# Patient Record
Sex: MALE | Race: OTHER | Employment: STUDENT | URBAN - METROPOLITAN AREA
[De-identification: names, ages, dates, MRNs, and addresses within clinical notes are randomized per-mention and may not be internally consistent; named-entity substitution may affect disease eponyms.]

---

## 2019-10-11 ENCOUNTER — OFFICE VISIT (OUTPATIENT)
Dept: CARDIOLOGY | Age: 19
End: 2019-10-11

## 2019-10-11 ENCOUNTER — HOSPITAL ENCOUNTER (OUTPATIENT)
Dept: NON INVASIVE DIAGNOSTICS | Age: 19
Discharge: HOME | End: 2019-10-11
Payer: COMMERCIAL

## 2019-10-11 VITALS
OXYGEN SATURATION: 98 % | DIASTOLIC BLOOD PRESSURE: 85 MMHG | RESPIRATION RATE: 18 BRPM | BODY MASS INDEX: 25.15 KG/M2 | HEART RATE: 70 BPM | SYSTOLIC BLOOD PRESSURE: 126 MMHG | HEIGHT: 69 IN | WEIGHT: 169.8 LBS

## 2019-10-11 DIAGNOSIS — R42 DIZZINESS: ICD-10-CM

## 2019-10-11 DIAGNOSIS — R42 LIGHTHEADED: ICD-10-CM

## 2019-10-11 DIAGNOSIS — R42 LIGHTHEADED: Primary | ICD-10-CM

## 2019-10-11 DIAGNOSIS — R55 SYNCOPE, UNSPECIFIED SYNCOPE TYPE: ICD-10-CM

## 2019-10-11 LAB
LV EF: 60 %
LVEF MODALITY: NORMAL

## 2019-10-11 PROCEDURE — 99202 OFFICE O/P NEW SF 15 MIN: CPT | Performed by: INTERNAL MEDICINE

## 2019-10-11 PROCEDURE — 93017 CV STRESS TEST TRACING ONLY: CPT

## 2019-10-11 PROCEDURE — 93226 XTRNL ECG REC<48 HR SCAN A/R: CPT

## 2019-10-11 PROCEDURE — 93306 TTE W/DOPPLER COMPLETE: CPT

## 2019-10-11 PROCEDURE — 93225 XTRNL ECG REC<48 HRS REC: CPT

## 2019-10-11 PROCEDURE — 93000 ELECTROCARDIOGRAM COMPLETE: CPT | Performed by: INTERNAL MEDICINE

## 2019-10-11 RX ORDER — M-VIT,TX,IRON,MINS/CALC/FOLIC 27MG-0.4MG
1 TABLET ORAL DAILY
COMMUNITY

## 2019-10-11 SDOH — SOCIAL STABILITY: SOCIAL INSECURITY
WITHIN THE LAST YEAR, HAVE YOU BEEN HUMILIATED OR EMOTIONALLY ABUSED IN OTHER WAYS BY YOUR PARTNER OR EX-PARTNER?: PATIENT DECLINED

## 2019-10-11 SDOH — SOCIAL STABILITY: SOCIAL INSECURITY
WITHIN THE LAST YEAR, HAVE YOU BEEN KICKED, HIT, SLAPPED, OR OTHERWISE PHYSICALLY HURT BY YOUR PARTNER OR EX-PARTNER?: PATIENT DECLINED

## 2019-10-11 SDOH — SOCIAL STABILITY: SOCIAL NETWORK
DO YOU BELONG TO ANY CLUBS OR ORGANIZATIONS SUCH AS CHURCH GROUPS UNIONS, FRATERNAL OR ATHLETIC GROUPS, OR SCHOOL GROUPS?: PATIENT DECLINED

## 2019-10-11 SDOH — ECONOMIC STABILITY: FOOD INSECURITY: WITHIN THE PAST 12 MONTHS, YOU WORRIED THAT YOUR FOOD WOULD RUN OUT BEFORE YOU GOT MONEY TO BUY MORE.: PATIENT DECLINED

## 2019-10-11 SDOH — ECONOMIC STABILITY: TRANSPORTATION INSECURITY
IN THE PAST 12 MONTHS, HAS THE LACK OF TRANSPORTATION KEPT YOU FROM MEDICAL APPOINTMENTS OR FROM GETTING MEDICATIONS?: PATIENT DECLINED

## 2019-10-11 SDOH — SOCIAL STABILITY: SOCIAL INSECURITY
WITHIN THE LAST YEAR, HAVE TO BEEN RAPED OR FORCED TO HAVE ANY KIND OF SEXUAL ACTIVITY BY YOUR PARTNER OR EX-PARTNER?: PATIENT DECLINED

## 2019-10-11 SDOH — SOCIAL STABILITY: SOCIAL NETWORK: HOW OFTEN DO YOU GET TOGETHER WITH FRIENDS OR RELATIVES?: PATIENT DECLINED

## 2019-10-11 SDOH — ECONOMIC STABILITY: INCOME INSECURITY: HOW HARD IS IT FOR YOU TO PAY FOR THE VERY BASICS LIKE FOOD, HOUSING, MEDICAL CARE, AND HEATING?: PATIENT DECLINED

## 2019-10-11 SDOH — SOCIAL STABILITY: SOCIAL NETWORK: ARE YOU MARRIED, WIDOWED, DIVORCED, SEPARATED, NEVER MARRIED, OR LIVING WITH A PARTNER?: PATIENT DECLINED

## 2019-10-11 SDOH — SOCIAL STABILITY: SOCIAL NETWORK: HOW OFTEN DO YOU ATTENT MEETINGS OF THE CLUB OR ORGANIZATION YOU BELONG TO?: PATIENT DECLINED

## 2019-10-11 SDOH — HEALTH STABILITY: MENTAL HEALTH
STRESS IS WHEN SOMEONE FEELS TENSE, NERVOUS, ANXIOUS, OR CAN'T SLEEP AT NIGHT BECAUSE THEIR MIND IS TROUBLED. HOW STRESSED ARE YOU?: PATIENT DECLINED

## 2019-10-11 SDOH — SOCIAL STABILITY: SOCIAL NETWORK: IN A TYPICAL WEEK, HOW MANY TIMES DO YOU TALK ON THE PHONE WITH FAMILY, FRIENDS, OR NEIGHBORS?: PATIENT DECLINED

## 2019-10-11 SDOH — ECONOMIC STABILITY: TRANSPORTATION INSECURITY
IN THE PAST 12 MONTHS, HAS LACK OF TRANSPORTATION KEPT YOU FROM MEETINGS, WORK, OR FROM GETTING THINGS NEEDED FOR DAILY LIVING?: PATIENT DECLINED

## 2019-10-11 SDOH — ECONOMIC STABILITY: FOOD INSECURITY: WITHIN THE PAST 12 MONTHS, THE FOOD YOU BOUGHT JUST DIDN'T LAST AND YOU DIDN'T HAVE MONEY TO GET MORE.: PATIENT DECLINED

## 2019-10-11 SDOH — SOCIAL STABILITY: SOCIAL INSECURITY: WITHIN THE LAST YEAR, HAVE YOU BEEN AFRAID OF YOUR PARTNER OR EX-PARTNER?: PATIENT DECLINED

## 2019-10-11 SDOH — HEALTH STABILITY: PHYSICAL HEALTH
ON AVERAGE, HOW MANY DAYS PER WEEK DO YOU ENGAGE IN MODERATE TO STRENUOUS EXERCISE (LIKE A BRISK WALK)?: PATIENT DECLINED

## 2019-10-11 SDOH — SOCIAL STABILITY: SOCIAL NETWORK: HOW OFTEN DO YOU ATTEND CHURCH OR RELIGIOUS SERVICES?: PATIENT DECLINED

## 2019-10-11 SDOH — HEALTH STABILITY: PHYSICAL HEALTH: ON AVERAGE, HOW MANY MINUTES DO YOU ENGAGE IN EXERCISE AT THIS LEVEL?: PATIENT DECLINED

## 2019-10-14 ENCOUNTER — TELEPHONE (OUTPATIENT)
Dept: CARDIOLOGY | Age: 19
End: 2019-10-14

## 2019-10-15 ENCOUNTER — OFFICE VISIT (OUTPATIENT)
Dept: CARDIOLOGY | Age: 19
End: 2019-10-15
Payer: COMMERCIAL

## 2019-10-15 ENCOUNTER — HOSPITAL ENCOUNTER (OUTPATIENT)
Dept: NON INVASIVE DIAGNOSTICS | Age: 19
Discharge: HOME | End: 2019-10-15
Payer: COMMERCIAL

## 2019-10-15 VITALS
HEIGHT: 69 IN | SYSTOLIC BLOOD PRESSURE: 123 MMHG | BODY MASS INDEX: 25.59 KG/M2 | WEIGHT: 172.8 LBS | RESPIRATION RATE: 17 BRPM | DIASTOLIC BLOOD PRESSURE: 68 MMHG

## 2019-10-15 DIAGNOSIS — R42 LIGHTHEADED: Primary | ICD-10-CM

## 2019-10-15 DIAGNOSIS — R55 SYNCOPE, UNSPECIFIED SYNCOPE TYPE: ICD-10-CM

## 2019-10-15 DIAGNOSIS — R42 LIGHTHEADED: ICD-10-CM

## 2019-10-15 DIAGNOSIS — R42 DIZZINESS: ICD-10-CM

## 2019-10-15 LAB
ACQUISITION DURATION: NORMAL S
AVERAGE HEART RATE: 80 BPM
EKG DIAGNOSIS: NORMAL
HOLTER MAX HEART RATE: 159 BPM
HOOKUP DATE: NORMAL
HOOKUP TIME: NORMAL
MAX HEART RATE TIME/DATE: NORMAL
MIN HEART RATE TIME/DATE: NORMAL
MIN HEART RATE: 44 BPM
NUMBER OF QRS COMPLEXES: NORMAL
NUMBER OF SUPRAVENTRICULAR BEATS IN RUNS: 0
NUMBER OF SUPRAVENTRICULAR COUPLETS: 0
NUMBER OF SUPRAVENTRICULAR ECTOPICS: 0
NUMBER OF SUPRAVENTRICULAR ISOLATED BEATS: 0
NUMBER OF SUPRAVENTRICULAR RUNS: 0
NUMBER OF VENTRICULAR BEATS IN RUNS: 0
NUMBER OF VENTRICULAR BIGEMINAL CYCLES: 0
NUMBER OF VENTRICULAR COUPLETS: 0
NUMBER OF VENTRICULAR ECTOPICS: 0
NUMBER OF VENTRICULAR ISOLATED BEATS: 0
NUMBER OF VENTRICULAR RUNS: 0

## 2019-10-15 PROCEDURE — 99213 OFFICE O/P EST LOW 20 MIN: CPT | Performed by: INTERNAL MEDICINE

## 2019-10-15 PROCEDURE — 93660 TILT TABLE EVALUATION: CPT

## 2019-10-15 PROCEDURE — 6370000000 HC RX 637 (ALT 250 FOR IP): Performed by: INTERNAL MEDICINE

## 2019-10-15 RX ORDER — NITROGLYCERIN 0.3 MG/1
0.3 TABLET SUBLINGUAL EVERY 5 MIN PRN
Status: DISCONTINUED | OUTPATIENT
Start: 2019-10-15 | End: 2019-10-16 | Stop reason: HOSPADM

## 2019-10-15 RX ORDER — DILTIAZEM HYDROCHLORIDE 120 MG/1
120 CAPSULE, COATED, EXTENDED RELEASE ORAL DAILY
Qty: 90 CAPSULE | Refills: 3 | Status: SHIPPED | OUTPATIENT
Start: 2019-10-15

## 2019-10-15 RX ADMIN — NITROGLYCERIN 0.3 MG: 0.3 TABLET, ORALLY DISINTEGRATING SUBLINGUAL at 14:08

## 2019-11-12 ENCOUNTER — HOSPITAL ENCOUNTER (EMERGENCY)
Age: 19
Discharge: HOME | End: 2019-11-12
Payer: COMMERCIAL

## 2019-11-12 VITALS
RESPIRATION RATE: 14 BRPM | DIASTOLIC BLOOD PRESSURE: 70 MMHG | OXYGEN SATURATION: 100 % | TEMPERATURE: 97.9 F | WEIGHT: 167.55 LBS | HEART RATE: 80 BPM | SYSTOLIC BLOOD PRESSURE: 126 MMHG | BODY MASS INDEX: 24.74 KG/M2

## 2019-11-12 DIAGNOSIS — R42 LIGHT HEADEDNESS: Primary | ICD-10-CM

## 2019-11-12 LAB
ANION GAP SERPL CALCULATED.3IONS-SCNC: 13 MMOL/L (ref 9–17)
BUN BLDV-MCNC: 13 MG/DL (ref 6–20)
BUN/CREAT BLD: 13 (ref 9–20)
CALCIUM SERPL-MCNC: 9.8 MG/DL (ref 8.6–10.4)
CHLORIDE BLD-SCNC: 98 MMOL/L (ref 98–107)
CO2: 27 MMOL/L (ref 20–31)
CREAT SERPL-MCNC: 0.98 MG/DL (ref 0.7–1.2)
GFR AFRICAN AMERICAN: >60 ML/MIN
GFR NON-AFRICAN AMERICAN: >60 ML/MIN
GFR SERPL CREATININE-BSD FRML MDRD: ABNORMAL ML/MIN/{1.73_M2}
GFR SERPL CREATININE-BSD FRML MDRD: ABNORMAL ML/MIN/{1.73_M2}
GLUCOSE BLD-MCNC: 89 MG/DL (ref 70–99)
HCT VFR BLD CALC: 48.2 % (ref 40.7–50.3)
HEMOGLOBIN: 16.2 G/DL (ref 13–17)
MCH RBC QN AUTO: 30.1 PG (ref 25.2–33.5)
MCHC RBC AUTO-ENTMCNC: 33.6 G/DL (ref 28.4–34.8)
MCV RBC AUTO: 89.6 FL (ref 82.6–102.9)
NRBC AUTOMATED: 0 PER 100 WBC
PDW BLD-RTO: 12.2 % (ref 11.8–14.4)
PLATELET # BLD: 278 K/UL (ref 138–453)
PMV BLD AUTO: 11 FL (ref 8.1–13.5)
POTASSIUM SERPL-SCNC: 3.6 MMOL/L (ref 3.7–5.3)
RBC # BLD: 5.38 M/UL (ref 4.21–5.77)
SODIUM BLD-SCNC: 138 MMOL/L (ref 135–144)
TSH SERPL DL<=0.05 MIU/L-ACNC: 1.27 MIU/L (ref 0.3–5)
WBC # BLD: 10 K/UL (ref 4.5–13.5)

## 2019-11-12 PROCEDURE — 80048 BASIC METABOLIC PNL TOTAL CA: CPT

## 2019-11-12 PROCEDURE — 99284 EMERGENCY DEPT VISIT MOD MDM: CPT

## 2019-11-12 PROCEDURE — 84443 ASSAY THYROID STIM HORMONE: CPT

## 2019-11-12 PROCEDURE — 93226 XTRNL ECG REC<48 HR SCAN A/R: CPT

## 2019-11-12 PROCEDURE — 93005 ELECTROCARDIOGRAM TRACING: CPT | Performed by: PHYSICIAN ASSISTANT

## 2019-11-12 PROCEDURE — 85027 COMPLETE CBC AUTOMATED: CPT

## 2019-11-12 PROCEDURE — 2580000003 HC RX 258: Performed by: PHYSICIAN ASSISTANT

## 2019-11-12 PROCEDURE — 93225 XTRNL ECG REC<48 HRS REC: CPT

## 2019-11-12 RX ORDER — 0.9 % SODIUM CHLORIDE 0.9 %
500 INTRAVENOUS SOLUTION INTRAVENOUS ONCE
Status: COMPLETED | OUTPATIENT
Start: 2019-11-12 | End: 2019-11-12

## 2019-11-12 RX ADMIN — SODIUM CHLORIDE 500 ML: 9 INJECTION, SOLUTION INTRAVENOUS at 17:07

## 2019-11-12 ASSESSMENT — ENCOUNTER SYMPTOMS
BACK PAIN: 0
WHEEZING: 0
DIARRHEA: 0
ABDOMINAL PAIN: 0
EYE REDNESS: 0
COUGH: 0
SORE THROAT: 0
RHINORRHEA: 0
CONSTIPATION: 0
EYE DISCHARGE: 0
BLOOD IN STOOL: 0
SHORTNESS OF BREATH: 0
NAUSEA: 0
CHEST TIGHTNESS: 0
VOMITING: 0

## 2019-11-13 LAB
EKG ATRIAL RATE: 75 BPM
EKG P AXIS: 56 DEGREES
EKG P-R INTERVAL: 148 MS
EKG Q-T INTERVAL: 360 MS
EKG QRS DURATION: 92 MS
EKG QTC CALCULATION (BAZETT): 402 MS
EKG R AXIS: 77 DEGREES
EKG T AXIS: 16 DEGREES
EKG VENTRICULAR RATE: 75 BPM

## 2019-11-13 PROCEDURE — 93010 ELECTROCARDIOGRAM REPORT: CPT | Performed by: INTERNAL MEDICINE

## 2019-11-18 LAB
ACQUISITION DURATION: NORMAL S
AVERAGE HEART RATE: 76 BPM
EKG DIAGNOSIS: NORMAL
HOLTER MAX HEART RATE: 164 BPM
HOOKUP DATE: NORMAL
HOOKUP TIME: NORMAL
MAX HEART RATE TIME/DATE: NORMAL
MIN HEART RATE TIME/DATE: NORMAL
MIN HEART RATE: 44 BPM
NUMBER OF QRS COMPLEXES: NORMAL
NUMBER OF SUPRAVENTRICULAR BEATS IN RUNS: 0
NUMBER OF SUPRAVENTRICULAR COUPLETS: 0
NUMBER OF SUPRAVENTRICULAR ECTOPICS: 13
NUMBER OF SUPRAVENTRICULAR ISOLATED BEATS: 13
NUMBER OF SUPRAVENTRICULAR RUNS: 0
NUMBER OF VENTRICULAR BEATS IN RUNS: 0
NUMBER OF VENTRICULAR BIGEMINAL CYCLES: 0
NUMBER OF VENTRICULAR COUPLETS: 0
NUMBER OF VENTRICULAR ECTOPICS: 1
NUMBER OF VENTRICULAR ISOLATED BEATS: 1
NUMBER OF VENTRICULAR RUNS: 0

## 2020-08-22 ENCOUNTER — TELEPHONE (OUTPATIENT)
Dept: SPORTS MEDICINE | Facility: CLINIC | Age: 20
End: 2020-08-22

## 2020-08-22 DIAGNOSIS — Z20.822 EXPOSURE TO COVID-19 VIRUS: Primary | ICD-10-CM

## 2020-08-31 ENCOUNTER — LAB VISIT (OUTPATIENT)
Dept: SPORTS MEDICINE | Facility: CLINIC | Age: 20
End: 2020-08-31
Payer: OTHER GOVERNMENT

## 2020-08-31 DIAGNOSIS — Z20.822 EXPOSURE TO COVID-19 VIRUS: ICD-10-CM

## 2020-08-31 PROCEDURE — U0003 INFECTIOUS AGENT DETECTION BY NUCLEIC ACID (DNA OR RNA); SEVERE ACUTE RESPIRATORY SYNDROME CORONAVIRUS 2 (SARS-COV-2) (CORONAVIRUS DISEASE [COVID-19]), AMPLIFIED PROBE TECHNIQUE, MAKING USE OF HIGH THROUGHPUT TECHNOLOGIES AS DESCRIBED BY CMS-2020-01-R: HCPCS

## 2020-09-01 LAB — SARS-COV-2 RNA RESP QL NAA+PROBE: NOT DETECTED

## 2020-09-28 ENCOUNTER — HOSPITAL ENCOUNTER (INPATIENT)
Facility: OTHER | Age: 20
LOS: 4 days | Discharge: HOME | DRG: 316 | End: 2020-10-02
Attending: EMERGENCY MEDICINE | Admitting: EMERGENCY MEDICINE
Payer: COMMERCIAL

## 2020-09-28 DIAGNOSIS — R79.89 ELEVATED TROPONIN: ICD-10-CM

## 2020-09-28 DIAGNOSIS — I31.9 PERICARDITIS: ICD-10-CM

## 2020-09-28 DIAGNOSIS — I30.0 ACUTE IDIOPATHIC PERICARDITIS: ICD-10-CM

## 2020-09-28 DIAGNOSIS — R07.9 CHEST PAIN: ICD-10-CM

## 2020-09-28 DIAGNOSIS — I30.9 ACUTE PERICARDITIS, UNSPECIFIED TYPE: Primary | ICD-10-CM

## 2020-09-28 LAB
ALBUMIN SERPL BCP-MCNC: 3.1 G/DL (ref 3.5–5.2)
ALP SERPL-CCNC: 62 U/L (ref 55–135)
ALT SERPL W/O P-5'-P-CCNC: 26 U/L (ref 10–44)
ANION GAP SERPL CALC-SCNC: 12 MMOL/L (ref 8–16)
AST SERPL-CCNC: 100 U/L (ref 10–40)
AV PEAK GRADIENT: 3 MMHG
AV VELOCITY RATIO: 0.98
BASOPHILS # BLD AUTO: 0.03 K/UL (ref 0–0.2)
BASOPHILS NFR BLD: 0.3 % (ref 0–1.9)
BILIRUB SERPL-MCNC: 0.4 MG/DL (ref 0.1–1)
BNP SERPL-MCNC: 199 PG/ML (ref 0–99)
BSA FOR ECHO PROCEDURE: 1.95 M2
BUN SERPL-MCNC: 13 MG/DL (ref 6–20)
CALCIUM SERPL-MCNC: 9 MG/DL (ref 8.7–10.5)
CHLORIDE SERPL-SCNC: 109 MMOL/L (ref 95–110)
CK SERPL-CCNC: 830 U/L (ref 20–200)
CO2 SERPL-SCNC: 20 MMOL/L (ref 23–29)
CREAT SERPL-MCNC: 0.9 MG/DL (ref 0.5–1.4)
CRP SERPL-MCNC: 66.3 MG/L (ref 0–8.2)
CTP QC/QA: YES
CV ECHO LV RWT: 0.21 CM
DIFFERENTIAL METHOD: ABNORMAL
DOP CALC AO PEAK VEL: 0.92 M/S
DOP CALC LVOT AREA: 3.5 CM2
DOP CALC LVOT DIAMETER: 2.1 CM
DOP CALC LVOT PEAK VEL: 0.9 M/S
DOP CALC LVOT STROKE VOLUME: 62.04 CM3
DOP CALCLVOT PEAK VEL VTI: 17.92 CM
E WAVE DECELERATION TIME: 115.59 MSEC
E/A RATIO: 1.19
E/E' RATIO: 5.83 M/S
ECHO LV POSTERIOR WALL: 0.6 CM (ref 0.6–1.1)
EOSINOPHIL # BLD AUTO: 0.1 K/UL (ref 0–0.5)
EOSINOPHIL NFR BLD: 0.9 % (ref 0–8)
ERYTHROCYTE [DISTWIDTH] IN BLOOD BY AUTOMATED COUNT: 12.8 % (ref 11.5–14.5)
ERYTHROCYTE [SEDIMENTATION RATE] IN BLOOD: 30 MM/HR (ref 0–10)
EST. GFR  (AFRICAN AMERICAN): >60 ML/MIN/1.73 M^2
EST. GFR  (NON AFRICAN AMERICAN): >60 ML/MIN/1.73 M^2
FRACTIONAL SHORTENING: 31 % (ref 28–44)
GLUCOSE SERPL-MCNC: 123 MG/DL (ref 70–110)
HCT VFR BLD AUTO: 43.8 % (ref 40–54)
HGB BLD-MCNC: 14.6 G/DL (ref 14–18)
IMM GRANULOCYTES # BLD AUTO: 0.06 K/UL (ref 0–0.04)
IMM GRANULOCYTES NFR BLD AUTO: 0.6 % (ref 0–0.5)
INTERVENTRICULAR SEPTUM: 0.59 CM (ref 0.6–1.1)
IVRT: 41.86 MSEC
LA MAJOR: 4.52 CM
LA MINOR: 3.54 CM
LA WIDTH: 3.66 CM
LEFT ATRIUM SIZE: 3.07 CM
LEFT ATRIUM VOLUME INDEX MOD: 15 ML/M2
LEFT ATRIUM VOLUME INDEX: 19.6 ML/M2
LEFT ATRIUM VOLUME MOD: 29 CM3
LEFT ATRIUM VOLUME: 37.92 CM3
LEFT INTERNAL DIMENSION IN SYSTOLE: 3.96 CM (ref 2.1–4)
LEFT VENTRICLE DIASTOLIC VOLUME INDEX: 83.13 ML/M2
LEFT VENTRICLE DIASTOLIC VOLUME: 161.06 ML
LEFT VENTRICLE MASS INDEX: 62 G/M2
LEFT VENTRICLE SYSTOLIC VOLUME INDEX: 35.3 ML/M2
LEFT VENTRICLE SYSTOLIC VOLUME: 68.46 ML
LEFT VENTRICULAR INTERNAL DIMENSION IN DIASTOLE: 5.72 CM (ref 3.5–6)
LEFT VENTRICULAR MASS: 119.4 G
LV LATERAL E/E' RATIO: 6.36 M/S
LV SEPTAL E/E' RATIO: 5.38 M/S
LYMPHOCYTES # BLD AUTO: 2.2 K/UL (ref 1–4.8)
LYMPHOCYTES NFR BLD: 21.5 % (ref 18–48)
MCH RBC QN AUTO: 29.5 PG (ref 27–31)
MCHC RBC AUTO-ENTMCNC: 33.3 G/DL (ref 32–36)
MCV RBC AUTO: 89 FL (ref 82–98)
MONOCYTES # BLD AUTO: 1.3 K/UL (ref 0.3–1)
MONOCYTES NFR BLD: 12.8 % (ref 4–15)
MV PEAK A VEL: 0.59 M/S
MV PEAK E VEL: 0.7 M/S
MV STENOSIS PRESSURE HALF TIME: 33.52 MS
MV VALVE AREA P 1/2 METHOD: 6.56 CM2
NEUTROPHILS # BLD AUTO: 6.5 K/UL (ref 1.8–7.7)
NEUTROPHILS NFR BLD: 63.9 % (ref 38–73)
NRBC BLD-RTO: 0 /100 WBC
PISA MRMAX VEL: 0.04 M/S
PISA TR MAX VEL: 2.37 M/S
PLATELET # BLD AUTO: 299 K/UL (ref 150–350)
PMV BLD AUTO: 10.2 FL (ref 9.2–12.9)
POTASSIUM SERPL-SCNC: 3.5 MMOL/L (ref 3.5–5.1)
PROT SERPL-MCNC: 6.1 G/DL (ref 6–8.4)
PULM VEIN S/D RATIO: 1.07
PV PEAK D VEL: 0.3 M/S
PV PEAK S VEL: 0.32 M/S
PV PEAK VELOCITY: 0.75 CM/S
RA MAJOR: 5.31 CM
RA WIDTH: 3.73 CM
RBC # BLD AUTO: 4.95 M/UL (ref 4.6–6.2)
SARS-COV-2 RDRP RESP QL NAA+PROBE: NEGATIVE
SINUS: 2.22 CM
SODIUM SERPL-SCNC: 141 MMOL/L (ref 136–145)
STJ: 1.87 CM
TDI LATERAL: 0.11 M/S
TDI SEPTAL: 0.13 M/S
TDI: 0.12 M/S
TR MAX PG: 22 MMHG
TRICUSPID ANNULAR PLANE SYSTOLIC EXCURSION: 1.99 CM
TROPONIN I SERPL DL<=0.01 NG/ML-MCNC: 21.77 NG/ML (ref 0–0.03)
TROPONIN I SERPL DL<=0.01 NG/ML-MCNC: 31.1 NG/ML (ref 0–0.03)
WBC # BLD AUTO: 10.14 K/UL (ref 3.9–12.7)

## 2020-09-28 PROCEDURE — 93010 ELECTROCARDIOGRAM REPORT: CPT | Mod: 76,,, | Performed by: INTERNAL MEDICINE

## 2020-09-28 PROCEDURE — 93010 ELECTROCARDIOGRAM REPORT: CPT | Mod: ,,, | Performed by: INTERNAL MEDICINE

## 2020-09-28 PROCEDURE — 63600175 PHARM REV CODE 636 W HCPCS: Performed by: EMERGENCY MEDICINE

## 2020-09-28 PROCEDURE — 82550 ASSAY OF CK (CPK): CPT

## 2020-09-28 PROCEDURE — 85651 RBC SED RATE NONAUTOMATED: CPT

## 2020-09-28 PROCEDURE — 25000003 PHARM REV CODE 250: Performed by: EMERGENCY MEDICINE

## 2020-09-28 PROCEDURE — 85025 COMPLETE CBC W/AUTO DIFF WBC: CPT

## 2020-09-28 PROCEDURE — 99291 CRITICAL CARE FIRST HOUR: CPT | Mod: 25

## 2020-09-28 PROCEDURE — 80053 COMPREHEN METABOLIC PANEL: CPT

## 2020-09-28 PROCEDURE — U0002 COVID-19 LAB TEST NON-CDC: HCPCS | Performed by: EMERGENCY MEDICINE

## 2020-09-28 PROCEDURE — 83880 ASSAY OF NATRIURETIC PEPTIDE: CPT

## 2020-09-28 PROCEDURE — 63600175 PHARM REV CODE 636 W HCPCS: Performed by: INTERNAL MEDICINE

## 2020-09-28 PROCEDURE — 11000001 HC ACUTE MED/SURG PRIVATE ROOM

## 2020-09-28 PROCEDURE — C9113 INJ PANTOPRAZOLE SODIUM, VIA: HCPCS | Performed by: EMERGENCY MEDICINE

## 2020-09-28 PROCEDURE — 93010 EKG 12-LEAD: ICD-10-PCS | Mod: ,,, | Performed by: INTERNAL MEDICINE

## 2020-09-28 PROCEDURE — 94761 N-INVAS EAR/PLS OXIMETRY MLT: CPT

## 2020-09-28 PROCEDURE — 93005 ELECTROCARDIOGRAM TRACING: CPT

## 2020-09-28 PROCEDURE — 84484 ASSAY OF TROPONIN QUANT: CPT

## 2020-09-28 PROCEDURE — 36415 COLL VENOUS BLD VENIPUNCTURE: CPT

## 2020-09-28 PROCEDURE — 86140 C-REACTIVE PROTEIN: CPT

## 2020-09-28 RX ORDER — INDOMETHACIN 25 MG/1
50 CAPSULE ORAL
Status: COMPLETED | OUTPATIENT
Start: 2020-09-28 | End: 2020-09-28

## 2020-09-28 RX ORDER — SODIUM CHLORIDE 0.9 % (FLUSH) 0.9 %
10 SYRINGE (ML) INJECTION
Status: DISCONTINUED | OUTPATIENT
Start: 2020-09-28 | End: 2020-10-02 | Stop reason: HOSPADM

## 2020-09-28 RX ORDER — HYDROCODONE BITARTRATE AND ACETAMINOPHEN 10; 325 MG/1; MG/1
1 TABLET ORAL EVERY 8 HOURS PRN
Status: DISCONTINUED | OUTPATIENT
Start: 2020-09-28 | End: 2020-10-02 | Stop reason: HOSPADM

## 2020-09-28 RX ORDER — MORPHINE SULFATE 2 MG/ML
5 INJECTION, SOLUTION INTRAMUSCULAR; INTRAVENOUS EVERY 4 HOURS PRN
Status: DISCONTINUED | OUTPATIENT
Start: 2020-09-28 | End: 2020-10-02 | Stop reason: HOSPADM

## 2020-09-28 RX ORDER — PANTOPRAZOLE SODIUM 40 MG/10ML
40 INJECTION, POWDER, LYOPHILIZED, FOR SOLUTION INTRAVENOUS DAILY
Status: DISCONTINUED | OUTPATIENT
Start: 2020-09-28 | End: 2020-10-01

## 2020-09-28 RX ORDER — MORPHINE SULFATE 4 MG/ML
4 INJECTION, SOLUTION INTRAMUSCULAR; INTRAVENOUS
Status: COMPLETED | OUTPATIENT
Start: 2020-09-28 | End: 2020-09-28

## 2020-09-28 RX ORDER — HYDROCODONE BITARTRATE AND ACETAMINOPHEN 5; 325 MG/1; MG/1
1 TABLET ORAL EVERY 6 HOURS PRN
Status: DISCONTINUED | OUTPATIENT
Start: 2020-09-28 | End: 2020-10-02 | Stop reason: HOSPADM

## 2020-09-28 RX ORDER — ONDANSETRON 2 MG/ML
4 INJECTION INTRAMUSCULAR; INTRAVENOUS EVERY 8 HOURS PRN
Status: DISCONTINUED | OUTPATIENT
Start: 2020-09-28 | End: 2020-10-02 | Stop reason: HOSPADM

## 2020-09-28 RX ORDER — COLCHICINE 0.6 MG/1
0.6 TABLET, FILM COATED ORAL
Status: COMPLETED | OUTPATIENT
Start: 2020-09-28 | End: 2020-09-28

## 2020-09-28 RX ORDER — COLCHICINE 0.6 MG/1
0.6 TABLET, FILM COATED ORAL 2 TIMES DAILY
Status: DISCONTINUED | OUTPATIENT
Start: 2020-09-28 | End: 2020-10-02 | Stop reason: HOSPADM

## 2020-09-28 RX ORDER — ASPIRIN 325 MG
325 TABLET ORAL
Status: COMPLETED | OUTPATIENT
Start: 2020-09-28 | End: 2020-09-28

## 2020-09-28 RX ORDER — INDOMETHACIN 25 MG/1
50 CAPSULE ORAL
Status: DISCONTINUED | OUTPATIENT
Start: 2020-09-28 | End: 2020-10-01

## 2020-09-28 RX ADMIN — HYDROCODONE BITARTRATE AND ACETAMINOPHEN 1 TABLET: 10; 325 TABLET ORAL at 04:09

## 2020-09-28 RX ADMIN — COLCHICINE 0.6 MG: 0.6 TABLET, FILM COATED ORAL at 04:09

## 2020-09-28 RX ADMIN — PANTOPRAZOLE SODIUM 40 MG: 40 INJECTION, POWDER, LYOPHILIZED, FOR SOLUTION INTRAVENOUS at 08:09

## 2020-09-28 RX ADMIN — INDOMETHACIN 50 MG: 25 CAPSULE ORAL at 12:09

## 2020-09-28 RX ADMIN — INDOMETHACIN 50 MG: 25 CAPSULE ORAL at 03:09

## 2020-09-28 RX ADMIN — MORPHINE SULFATE 5 MG: 2 INJECTION, SOLUTION INTRAMUSCULAR; INTRAVENOUS at 02:09

## 2020-09-28 RX ADMIN — INDOMETHACIN 50 MG: 25 CAPSULE ORAL at 05:09

## 2020-09-28 RX ADMIN — ASPIRIN 325 MG ORAL TABLET 325 MG: 325 PILL ORAL at 02:09

## 2020-09-28 RX ADMIN — MORPHINE SULFATE 4 MG: 4 INJECTION, SOLUTION INTRAMUSCULAR; INTRAVENOUS at 03:09

## 2020-09-28 RX ADMIN — COLCHICINE 0.6 MG: 0.6 TABLET, FILM COATED ORAL at 09:09

## 2020-09-28 RX ADMIN — HYDROCODONE BITARTRATE AND ACETAMINOPHEN 1 TABLET: 5; 325 TABLET ORAL at 01:09

## 2020-09-28 RX ADMIN — COLCHICINE 0.6 MG: 0.6 TABLET, FILM COATED ORAL at 03:09

## 2020-09-28 NOTE — ED TRIAGE NOTES
Pt presents to ED c/o constant CP x 1 day. Pt reports constant pressure with intermittent stabbing with associated SOB. Pt reports fever at home. Pt denies  HA, and n/v/d.  Pt reports relief with ibuprofen. Pt reports having sore throat and fevers last week.

## 2020-09-28 NOTE — ED PROVIDER NOTES
Encounter Date: 9/28/2020    SCRIBE #1 NOTE: I, Lisandro Murguia, am scribing for, and in the presence of, Dr. Pedroza.       History     Chief Complaint   Patient presents with    Chest Pain     since yesterday only relieved by ibuprofen     Time seen by provider: 2:13 AM    This is a 20 y.o. male who presents with complaint of chest pain that began yesterday. The pain is located across that chest and is constant with associated mild shortness of breath. He is unsure of any exacerbating factors. His pain is alleviated after taking ibuprofen, but comes back once it wears off.  Over the last week the patient reports experiencing sore throat and subjective fevers. The fever has since resolved, but he is still experiencing mild sore throat. He denies chills, congestion, cough, abdominal pain, vomiting, diarrhea, and dysuria. The patient admits to smoking cigarettes occasionally, drinking on weekends, and occasionally smoking marijuana. He denies cocaine use and any other illicit drug use.    The history is provided by the patient.     Review of patient's allergies indicates:  No Known Allergies  No past medical history on file.  No past surgical history on file.  No family history on file.  Social History     Tobacco Use    Smoking status: Not on file   Substance Use Topics    Alcohol use: Not on file    Drug use: Not on file     Review of Systems   Constitutional: Negative for chills and fever.   HENT: Positive for sore throat. Negative for congestion.    Eyes: Negative for visual disturbance.   Respiratory: Positive for shortness of breath. Negative for cough.    Cardiovascular: Positive for chest pain. Negative for palpitations.   Gastrointestinal: Negative for abdominal pain, diarrhea, nausea and vomiting.   Genitourinary: Negative for decreased urine volume, dysuria and frequency.   Musculoskeletal: Negative for joint swelling, neck pain and neck stiffness.   Skin: Negative for rash and wound.   Neurological:  Negative for weakness, numbness and headaches.   Psychiatric/Behavioral: Negative for behavioral problems and confusion.       Physical Exam     Initial Vitals [09/28/20 0203]   BP Pulse Resp Temp SpO2   128/86 80 20 98.9 °F (37.2 °C) 98 %      MAP       --         Physical Exam    Nursing note and vitals reviewed.  Constitutional: He appears well-developed and well-nourished. He is not diaphoretic. He appears distressed.   HENT:   Head: Normocephalic and atraumatic.   Mouth/Throat: Oropharynx is clear and moist.   Eyes: EOM are normal. Pupils are equal, round, and reactive to light. Right eye exhibits no discharge. Left eye exhibits no discharge.   Neck: Normal range of motion.   Cardiovascular: Normal rate, regular rhythm and normal heart sounds. Exam reveals no gallop and no friction rub.    No murmur heard.  Pulmonary/Chest: Breath sounds normal. No respiratory distress. He has no wheezes. He has no rhonchi. He has no rales.   Abdominal: Soft. There is no abdominal tenderness. There is no rebound and no guarding.   Musculoskeletal: Normal range of motion. No tenderness or edema.   Neurological: He is alert and oriented to person, place, and time. GCS score is 15. GCS eye subscore is 4. GCS verbal subscore is 5. GCS motor subscore is 6.   Skin: Skin is warm and dry. No rash and no abscess noted. No erythema. No pallor.   Psychiatric: He has a normal mood and affect. His behavior is normal. Judgment and thought content normal.         ED Course   Critical Care    Date/Time: 9/28/2020 6:39 AM  Performed by: Abbie Pedroza MD  Authorized by: Abbie Pedroza MD   Direct patient critical care time: 14 minutes  Additional history critical care time: 2 minutes  Ordering / reviewing critical care time: 7 minutes  Documentation critical care time: 6 minutes  Consulting other physicians critical care time: 8 minutes  Total critical care time (exclusive of procedural time) : 37 minutes  Critical care time was exclusive  of separately billable procedures and treating other patients.  Critical care was necessary to treat or prevent imminent or life-threatening deterioration of the following conditions: cardiac failure.  Critical care was time spent personally by me on the following activities: blood draw for specimens, development of treatment plan with patient or surrogate, discussions with consultants, interpretation of cardiac output measurements, evaluation of patient's response to treatment, examination of patient, obtaining history from patient or surrogate, ordering and performing treatments and interventions, ordering and review of laboratory studies, ordering and review of radiographic studies, pulse oximetry, re-evaluation of patient's condition and review of old charts.        Labs Reviewed   CBC W/ AUTO DIFFERENTIAL - Abnormal; Notable for the following components:       Result Value    Immature Granulocytes 0.6 (*)     Immature Grans (Abs) 0.06 (*)     Mono # 1.3 (*)     All other components within normal limits   COMPREHENSIVE METABOLIC PANEL - Abnormal; Notable for the following components:    CO2 20 (*)     Glucose 123 (*)     Albumin 3.1 (*)      (*)     All other components within normal limits   TROPONIN I - Abnormal; Notable for the following components:    Troponin I 21.771 (*)     All other components within normal limits   B-TYPE NATRIURETIC PEPTIDE - Abnormal; Notable for the following components:     (*)     All other components within normal limits   CK - Abnormal; Notable for the following components:     (*)     All other components within normal limits   SEDIMENTATION RATE - Abnormal; Notable for the following components:    Sed Rate 30 (*)     All other components within normal limits   C-REACTIVE PROTEIN - Abnormal; Notable for the following components:    CRP 66.3 (*)     All other components within normal limits   C-REACTIVE PROTEIN   SARS-COV-2 RDRP GENE     EKG Readings:  (Independently Interpreted)   Rhythm: Normal Sinus Rhythm. Heart Rate: 89.   ST elevation in inferior and lateral leads with no reciprocal changes.        Imaging Results          X-Ray Chest PA And Lateral (Final result)  Result time 09/28/20 02:51:16    Final result by Chceo Munoz MD (09/28/20 02:51:16)                 Impression:      No radiographic evidence of acute intrathoracic process.      Electronically signed by: Checo Munoz MD  Date:    09/28/2020  Time:    02:51             Narrative:    EXAMINATION:  XR CHEST PA AND LATERAL    CLINICAL HISTORY:  Chest Pain;    TECHNIQUE:  PA and lateral views of the chest were performed.    COMPARISON:  None    FINDINGS:  The cardiomediastinal silhouette appears within normal limits.  The lungs are symmetrically aerated without evidence of focal airspace consolidation.  There is no pleural effusion or pneumothorax.  Visualized osseous structures appear intact.                              X-Rays:   Independently Interpreted Readings:   Chest X-Ray: No infiltrate, effusion, or PTX. Will defer to radiology.     Medical Decision Making:   History:   Old Medical Records: I decided to obtain old medical records.  Independently Interpreted Test(s):   I have ordered and independently interpreted X-rays - see prior notes.  I have ordered and independently interpreted EKG Reading(s) - see prior notes  Clinical Tests:   Lab Tests: Ordered and Reviewed  Radiological Study: Ordered and Reviewed  Medical Tests: Ordered and Reviewed  ED Management:  Emergent evaluation a 20-year-old male who presents with complaint of chest pain, mild shortness of breath.  He reports viral syndrome type symptoms over the last week.  Vital signs are benign, afebrile.  Physical exam is benign.  There is no audible friction rub.  EKG shows diffuse ST elevations.  I discussed the case immediately with Cardiology as I did consider cath lab activation.  Shared decision making concurred that  this would more likely be a pericarditis/myocarditis and no activation needed.  Labs are consistent with pericarditis/myocarditis, significant elevation in troponin, CPK, ESR, CRP.  I discussed the case again with Cardiology and colchicine and indomethacin are started.  He is admitted in serious condition for echocardiogram and further care.            Scribe Attestation:   Scribe #1: I performed the above scribed service and the documentation accurately describes the services I performed. I attest to the accuracy of the note.    Attending Attestation:           Physician Attestation for Scribe:  Physician Attestation Statement for Scribe #1: I, Dr. Pedroza, reviewed documentation, as scribed by Lisandro Murguia in my presence, and it is both accurate and complete.                 ED Course as of Sep 28 0637   Mon Sep 28, 2020   0225 I discussed the case with on-call Cardiology, Dr. Cerna. He will review EKG and call back.    [AK]   0234 I discussed the case again with Cardiology, Dr. Cerna.  He has reviewed EKG in feels it likely represents pericarditis.  He agrees with no activation of cath lab.  He states patient can be admitted to his service for echocardiogram this morning.    [AK]      ED Course User Index  [AK] Abbie Pedroza MD            Clinical Impression:       ICD-10-CM ICD-9-CM   1. Acute pericarditis, unspecified type  I30.9 420.90   2. Chest pain  R07.9 786.50   3. Elevated troponin  R79.89 790.6   4. Pericarditis  I31.9 423.9                          ED Disposition Condition    Admit                             Abbie Pedroza MD  09/28/20 0639       Abbie Pedroza MD  09/28/20 0657

## 2020-09-28 NOTE — NURSING
Pt arrived to unit from ED. Oriented to room. Rates chest pain 8/10 at this time; waiting on pharm approval to admin norco 10. Denies SOB. VSS on RA. Afebrile. Peripheral IVs/dressings CDI. SCDs placed on. Tele monitor placed on. Urinal at bedside. Bed low, locked. Side rails up x2. Nonskid socks on. Call light within reach. Will continue to monitor.

## 2020-09-28 NOTE — H&P
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  He says he was in his usual state of good health about a week ago when he started developing a sore throat and fever.  He had a COVID test that later in the week was reportedly negative.  He also had a flu test that was negative.  He started feeling better in 2 or 3 days, however then started having pain in the chest that would increase with taking in a deep breath.  He would take ibuprofen, every few hours to control the pain.  The night of admission he says the pains got a little worse, and he decided to seek further medical attention in the emergency room here.  The pains he says sometimes feel like a heaviness in the chest.  There is no change in the nature of pain on change of posture however deep inspiration seems to exaggerate the pain.  In the past he has had since the age of 12 dizziness, and near fainting episodes.  He has had numerous evaluations an examination of his heart, and he was told that these were all normal.  He has been in [place] for the last month.  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  He says he drinks and smokes occasionally , occasionally uses my 1 a.  Does not use any hard drugs.    His parents live in [place], are 48 and 49 respectively, an are in good health.    No medications prior to admission.       Review of patient's allergies indicates:  No Known Allergies    No past medical history on file.  No past surgical history on file.  No family history on file.  Social History     Tobacco Use    Smoking status: Not on file   Substance Use Topics    Alcohol use: Not on file    Drug use: Not on file        Physical Exam:  In no acute distress, looks comfortable.  The carotid upstroke is brisk there is no carotid  bruit chest is clear the heart size is grossly normal S1 and S2 are normal there is no audible murmur or gallop.  The abdomen is soft and nontender the bowel sounds are normal.  Extremities there is no clubbing cyanosis edema of feet.  Grossly the neurological exam is intact.    The electrocardiogram done at 2:10 a.m. this morning shows W[place] segment depression, with ST segment elevation in leads 2 3 AVF and, V4 through V6 suggestive of pericarditis.    Impression on Admission:  Acute myocarditis/pericarditis etiology probably viral.    Will review echocardiogram

## 2020-09-28 NOTE — PROGRESS NOTES
"Cardiology  Progress Notes            Subjective:  He says he is pain-free right now and comfortable.  Vital Signs:  Vitals:    09/28/20 0441 09/28/20 0500 09/28/20 0600 09/28/20 0741   BP:    (!) 111/55   BP Location:    Left arm   Patient Position:    Lying   Pulse:  (!) 55 (!) 54 64   Resp: 17   17   Temp:    97.1 °F (36.2 °C)   TempSrc:    Oral   SpO2:    99%   Weight: 78 kg (172 lb)   78 kg (172 lb)   Height: 5' 9" (1.753 m)   5' 9" (1.753 m)       Physical Exam:  Chest clear  Heart no murmur or gallop  Laboratory:  CBC:   Recent Labs   Lab 09/28/20 0225   WBC 10.14   RBC 4.95   HGB 14.6   HCT 43.8      MCV 89   MCH 29.5   MCHC 33.3     BMP:   Recent Labs   Lab 09/28/20 0225   *      K 3.5      CO2 20*   BUN 13   CREATININE 0.9   CALCIUM 9.0     CMP:   Recent Labs   Lab 09/28/20 0225   *   CALCIUM 9.0   ALBUMIN 3.1*   PROT 6.1      K 3.5   CO2 20*      BUN 13   CREATININE 0.9   ALKPHOS 62   ALT 26   *   BILITOT 0.4     LFTs:   Recent Labs   Lab 09/28/20 0225   ALT 26   *   ALKPHOS 62   BILITOT 0.4   PROT 6.1   ALBUMIN 3.1*     Coagulation: No results for input(s): PT, INR, APTT in the last 168 hours.  Cardiac markers:   Recent Labs   Lab 09/28/20 0437   TROPONINI 31.099*       Imaging:  X-Ray Chest PA And Lateral   Final Result      No radiographic evidence of acute intrathoracic process.         Electronically signed by: Checo Munoz MD   Date:    09/28/2020   Time:    02:51            Problems:   Acute myopericarditis.        Plan of Care: See Orders          Yossi Cerna  "

## 2020-09-28 NOTE — PLAN OF CARE
No significant events overnight. Remains free from fall, injury, and skin breakdown. Voiding witout difficulty; urinal at bedside. Ambulates in room independently. VSS on RA throughout the night. Positions self independently. Pain moderately controlled with PRN norco 10. Tele maintained; all alarms active and audible. Peripheral IVs/dressings CDI. SCDs in place. Plan of care reviewed with patient and all questions answered. Bed low, locked. Call light within reach. Purposeful rounding performed. Resting in bed, no other complaints at this time.

## 2020-09-29 PROBLEM — I40.9 ACUTE MYOCARDITIS: Status: ACTIVE | Noted: 2020-09-29

## 2020-09-29 LAB
BASOPHILS # BLD AUTO: 0.03 K/UL (ref 0–0.2)
BASOPHILS NFR BLD: 0.3 % (ref 0–1.9)
CK SERPL-CCNC: 566 U/L (ref 20–200)
CRP SERPL-MCNC: 91.41 MG/L (ref 0–3.19)
DIFFERENTIAL METHOD: ABNORMAL
EOSINOPHIL # BLD AUTO: 0.1 K/UL (ref 0–0.5)
EOSINOPHIL NFR BLD: 1 % (ref 0–8)
ERYTHROCYTE [DISTWIDTH] IN BLOOD BY AUTOMATED COUNT: 12.7 % (ref 11.5–14.5)
ERYTHROCYTE [SEDIMENTATION RATE] IN BLOOD: 32 MM/HR (ref 0–10)
HCT VFR BLD AUTO: 43.3 % (ref 40–54)
HGB BLD-MCNC: 14.4 G/DL (ref 14–18)
IMM GRANULOCYTES # BLD AUTO: 0.13 K/UL (ref 0–0.04)
IMM GRANULOCYTES NFR BLD AUTO: 1.4 % (ref 0–0.5)
LYMPHOCYTES # BLD AUTO: 2.2 K/UL (ref 1–4.8)
LYMPHOCYTES NFR BLD: 24.1 % (ref 18–48)
MCH RBC QN AUTO: 29.5 PG (ref 27–31)
MCHC RBC AUTO-ENTMCNC: 33.3 G/DL (ref 32–36)
MCV RBC AUTO: 89 FL (ref 82–98)
MONOCYTES # BLD AUTO: 1.1 K/UL (ref 0.3–1)
MONOCYTES NFR BLD: 12.5 % (ref 4–15)
NEUTROPHILS # BLD AUTO: 5.5 K/UL (ref 1.8–7.7)
NEUTROPHILS NFR BLD: 60.7 % (ref 38–73)
NRBC BLD-RTO: 0 /100 WBC
PLATELET # BLD AUTO: 301 K/UL (ref 150–350)
PMV BLD AUTO: 10.5 FL (ref 9.2–12.9)
RBC # BLD AUTO: 4.88 M/UL (ref 4.6–6.2)
WBC # BLD AUTO: 9.03 K/UL (ref 3.9–12.7)

## 2020-09-29 PROCEDURE — 86141 C-REACTIVE PROTEIN HS: CPT

## 2020-09-29 PROCEDURE — 85651 RBC SED RATE NONAUTOMATED: CPT

## 2020-09-29 PROCEDURE — 63600175 PHARM REV CODE 636 W HCPCS: Performed by: EMERGENCY MEDICINE

## 2020-09-29 PROCEDURE — 25000003 PHARM REV CODE 250: Performed by: EMERGENCY MEDICINE

## 2020-09-29 PROCEDURE — 25000003 PHARM REV CODE 250: Performed by: INTERNAL MEDICINE

## 2020-09-29 PROCEDURE — 85025 COMPLETE CBC W/AUTO DIFF WBC: CPT

## 2020-09-29 PROCEDURE — 93010 EKG 12-LEAD: ICD-10-PCS | Mod: ,,, | Performed by: INTERNAL MEDICINE

## 2020-09-29 PROCEDURE — 63600175 PHARM REV CODE 636 W HCPCS: Performed by: INTERNAL MEDICINE

## 2020-09-29 PROCEDURE — 36415 COLL VENOUS BLD VENIPUNCTURE: CPT

## 2020-09-29 PROCEDURE — 93005 ELECTROCARDIOGRAM TRACING: CPT

## 2020-09-29 PROCEDURE — 94761 N-INVAS EAR/PLS OXIMETRY MLT: CPT

## 2020-09-29 PROCEDURE — 82550 ASSAY OF CK (CPK): CPT

## 2020-09-29 PROCEDURE — 27000221 HC OXYGEN, UP TO 24 HOURS

## 2020-09-29 PROCEDURE — C9113 INJ PANTOPRAZOLE SODIUM, VIA: HCPCS | Performed by: EMERGENCY MEDICINE

## 2020-09-29 PROCEDURE — 93010 ELECTROCARDIOGRAM REPORT: CPT | Mod: ,,, | Performed by: INTERNAL MEDICINE

## 2020-09-29 PROCEDURE — 11000001 HC ACUTE MED/SURG PRIVATE ROOM

## 2020-09-29 RX ORDER — MAG HYDROX/ALUMINUM HYD/SIMETH 200-200-20
30 SUSPENSION, ORAL (FINAL DOSE FORM) ORAL
Status: DISCONTINUED | OUTPATIENT
Start: 2020-09-29 | End: 2020-10-02 | Stop reason: HOSPADM

## 2020-09-29 RX ORDER — KETOROLAC TROMETHAMINE 30 MG/ML
50 INJECTION, SOLUTION INTRAMUSCULAR; INTRAVENOUS EVERY 4 HOURS PRN
Status: DISCONTINUED | OUTPATIENT
Start: 2020-09-29 | End: 2020-09-29

## 2020-09-29 RX ORDER — KETOROLAC TROMETHAMINE 30 MG/ML
30 INJECTION, SOLUTION INTRAMUSCULAR; INTRAVENOUS EVERY 6 HOURS
Status: DISCONTINUED | OUTPATIENT
Start: 2020-09-29 | End: 2020-10-01

## 2020-09-29 RX ORDER — TRAMADOL HYDROCHLORIDE 50 MG/1
50 TABLET ORAL EVERY 4 HOURS PRN
Status: DISCONTINUED | OUTPATIENT
Start: 2020-09-29 | End: 2020-10-02 | Stop reason: HOSPADM

## 2020-09-29 RX ADMIN — KETOROLAC TROMETHAMINE 30 MG: 30 INJECTION, SOLUTION INTRAMUSCULAR at 05:09

## 2020-09-29 RX ADMIN — COLCHICINE 0.6 MG: 0.6 TABLET, FILM COATED ORAL at 08:09

## 2020-09-29 RX ADMIN — MORPHINE SULFATE 5 MG: 2 INJECTION, SOLUTION INTRAMUSCULAR; INTRAVENOUS at 06:09

## 2020-09-29 RX ADMIN — HYDROCODONE BITARTRATE AND ACETAMINOPHEN 1 TABLET: 10; 325 TABLET ORAL at 08:09

## 2020-09-29 RX ADMIN — INDOMETHACIN 50 MG: 25 CAPSULE ORAL at 12:09

## 2020-09-29 RX ADMIN — MAGNESIUM HYDROXIDE/ALUMINUM HYDROXICE/SIMETHICONE 30 ML: 120; 1200; 1200 SUSPENSION ORAL at 05:09

## 2020-09-29 RX ADMIN — PANTOPRAZOLE SODIUM 40 MG: 40 INJECTION, POWDER, LYOPHILIZED, FOR SOLUTION INTRAVENOUS at 08:09

## 2020-09-29 RX ADMIN — KETOROLAC TROMETHAMINE 30 MG: 30 INJECTION, SOLUTION INTRAMUSCULAR at 12:09

## 2020-09-29 RX ADMIN — KETOROLAC TROMETHAMINE 50 MG: 30 INJECTION, SOLUTION INTRAMUSCULAR at 08:09

## 2020-09-29 RX ADMIN — INDOMETHACIN 50 MG: 25 CAPSULE ORAL at 05:09

## 2020-09-29 RX ADMIN — MAGNESIUM HYDROXIDE/ALUMINUM HYDROXICE/SIMETHICONE 30 ML: 120; 1200; 1200 SUSPENSION ORAL at 12:09

## 2020-09-29 RX ADMIN — MORPHINE SULFATE 5 MG: 2 INJECTION, SOLUTION INTRAMUSCULAR; INTRAVENOUS at 10:09

## 2020-09-29 RX ADMIN — ONDANSETRON 4 MG: 2 INJECTION INTRAMUSCULAR; INTRAVENOUS at 07:09

## 2020-09-29 RX ADMIN — HYDROCODONE BITARTRATE AND ACETAMINOPHEN 1 TABLET: 5; 325 TABLET ORAL at 06:09

## 2020-09-29 RX ADMIN — INDOMETHACIN 50 MG: 25 CAPSULE ORAL at 07:09

## 2020-09-29 RX ADMIN — MORPHINE SULFATE 5 MG: 2 INJECTION, SOLUTION INTRAMUSCULAR; INTRAVENOUS at 07:09

## 2020-09-29 NOTE — PROGRESS NOTES
Notified Dr Cerna that pt is having severe cx pain after Morphine 5mg given @0738, Indomethacin 50mg given @0715 and Norco 5mg was given @0627. Dr Cerna stated he will be at the bedside in 10-15mins. House Supervisor, Stalin and Charge Nurse, Asia at the bedside.    0807-Telephone order w/ readback for 50mg IVToradol  ordered by Dr Cerna.    Will continue to monitor.

## 2020-09-29 NOTE — PLAN OF CARE
Pt NPO after midnight for scheduled lenka scan and stress test next shift per . Pt in NAD at this time, Purposeful rounding done, telemetry monitored. Pt in bed resting in NAD at this time. Bed alarm on SCD's in place.

## 2020-09-29 NOTE — PROGRESS NOTES
Cardiology  Progress Notes            Subjective:  Continues with intermittent sharp pleuritic chest pain.  Relief with morphine and with Toradol.    Received a call from nuclear Medicine to inform me that the nuclear camera is presently not functioning.    Vital Signs:  Vitals:    09/29/20 0700 09/29/20 0705 09/29/20 0738 09/29/20 0751   BP:  115/67  (!) 154/72   BP Location:  Right arm     Patient Position:  Lying     Pulse: 73 60  80   Resp:  (!) 22 20 (!) 24   Temp:  97.6 °F (36.4 °C)     TempSrc:  Oral     SpO2:  98%  98%   Weight:       Height:           Physical Exam:   Chest clear   Heart regular.  No murmur or gallop.  No audible rub.  Abdomen is soft and nontender the bowel  Sounds are normal extremities are warm  Laboratory:  CBC:   Recent Labs   Lab 09/29/20  0454   WBC 9.03   RBC 4.88   HGB 14.4   HCT 43.3      MCV 89   MCH 29.5   MCHC 33.3     BMP:   Recent Labs   Lab 09/28/20  0225   *      K 3.5      CO2 20*   BUN 13   CREATININE 0.9   CALCIUM 9.0     CMP:   Recent Labs   Lab 09/28/20  0225   *   CALCIUM 9.0   ALBUMIN 3.1*   PROT 6.1      K 3.5   CO2 20*      BUN 13   CREATININE 0.9   ALKPHOS 62   ALT 26   *   BILITOT 0.4     LFTs:   Recent Labs   Lab 09/28/20  0225   ALT 26   *   ALKPHOS 62   BILITOT 0.4   PROT 6.1   ALBUMIN 3.1*     Coagulation: No results for input(s): PT, INR, APTT in the last 168 hours.  Cardiac markers:   Recent Labs   Lab 09/28/20  0437   TROPONINI 31.099*       Imaging:  X-Ray Chest PA And Lateral   Final Result      No radiographic evidence of acute intrathoracic process.         Electronically signed by: Checo Munoz MD   Date:    09/28/2020   Time:    02:51      NM Myocardial Perfusion Spect Multi Exer    (Results Pending)         Problems:   Acute pericarditis  Acute myocarditis        Plan of Care: See Orders          Yossi Cerna

## 2020-09-29 NOTE — NURSING
Called and spoke with Nehemiah about pt telemetry showing significant ST elevation and that the pt was complaining of being hot and diaphoretic.  stated that the pt is scheduled for a stress test and lenka scan in the am that there were no more orders to be given at this time.

## 2020-09-29 NOTE — PROGRESS NOTES
Stress Test will be performed 9/30. Notified that pt can't eat after 0800am (on 9/30) and will send for pt @1130am.

## 2020-09-30 LAB
CK SERPL-CCNC: 272 U/L (ref 20–200)
TROPONIN I SERPL DL<=0.01 NG/ML-MCNC: 21.15 NG/ML (ref 0–0.03)

## 2020-09-30 PROCEDURE — 94761 N-INVAS EAR/PLS OXIMETRY MLT: CPT

## 2020-09-30 PROCEDURE — C9113 INJ PANTOPRAZOLE SODIUM, VIA: HCPCS | Performed by: EMERGENCY MEDICINE

## 2020-09-30 PROCEDURE — 25000003 PHARM REV CODE 250: Performed by: INTERNAL MEDICINE

## 2020-09-30 PROCEDURE — 25000003 PHARM REV CODE 250: Performed by: EMERGENCY MEDICINE

## 2020-09-30 PROCEDURE — 93010 EKG 12-LEAD: ICD-10-PCS | Mod: ,,, | Performed by: INTERNAL MEDICINE

## 2020-09-30 PROCEDURE — 93005 ELECTROCARDIOGRAM TRACING: CPT

## 2020-09-30 PROCEDURE — 84484 ASSAY OF TROPONIN QUANT: CPT

## 2020-09-30 PROCEDURE — 93010 ELECTROCARDIOGRAM REPORT: CPT | Mod: ,,, | Performed by: INTERNAL MEDICINE

## 2020-09-30 PROCEDURE — 63600175 PHARM REV CODE 636 W HCPCS: Performed by: EMERGENCY MEDICINE

## 2020-09-30 PROCEDURE — 11000001 HC ACUTE MED/SURG PRIVATE ROOM

## 2020-09-30 PROCEDURE — 63600175 PHARM REV CODE 636 W HCPCS: Performed by: INTERNAL MEDICINE

## 2020-09-30 PROCEDURE — 82550 ASSAY OF CK (CPK): CPT

## 2020-09-30 PROCEDURE — 36415 COLL VENOUS BLD VENIPUNCTURE: CPT

## 2020-09-30 RX ADMIN — MAGNESIUM HYDROXIDE/ALUMINUM HYDROXICE/SIMETHICONE 30 ML: 120; 1200; 1200 SUSPENSION ORAL at 11:09

## 2020-09-30 RX ADMIN — MAGNESIUM HYDROXIDE/ALUMINUM HYDROXICE/SIMETHICONE 30 ML: 120; 1200; 1200 SUSPENSION ORAL at 12:09

## 2020-09-30 RX ADMIN — MORPHINE SULFATE 5 MG: 2 INJECTION, SOLUTION INTRAMUSCULAR; INTRAVENOUS at 02:09

## 2020-09-30 RX ADMIN — MORPHINE SULFATE 5 MG: 2 INJECTION, SOLUTION INTRAMUSCULAR; INTRAVENOUS at 04:09

## 2020-09-30 RX ADMIN — COLCHICINE 0.6 MG: 0.6 TABLET, FILM COATED ORAL at 08:09

## 2020-09-30 RX ADMIN — MAGNESIUM HYDROXIDE/ALUMINUM HYDROXICE/SIMETHICONE 30 ML: 120; 1200; 1200 SUSPENSION ORAL at 06:09

## 2020-09-30 RX ADMIN — PANTOPRAZOLE SODIUM 40 MG: 40 INJECTION, POWDER, LYOPHILIZED, FOR SOLUTION INTRAVENOUS at 08:09

## 2020-09-30 RX ADMIN — KETOROLAC TROMETHAMINE 30 MG: 30 INJECTION, SOLUTION INTRAMUSCULAR at 06:09

## 2020-09-30 RX ADMIN — INDOMETHACIN 50 MG: 25 CAPSULE ORAL at 08:09

## 2020-09-30 RX ADMIN — HYDROCODONE BITARTRATE AND ACETAMINOPHEN 1 TABLET: 10; 325 TABLET ORAL at 04:09

## 2020-09-30 RX ADMIN — COLCHICINE 0.6 MG: 0.6 TABLET, FILM COATED ORAL at 09:09

## 2020-09-30 RX ADMIN — INDOMETHACIN 50 MG: 25 CAPSULE ORAL at 11:09

## 2020-09-30 RX ADMIN — KETOROLAC TROMETHAMINE 30 MG: 30 INJECTION, SOLUTION INTRAMUSCULAR at 12:09

## 2020-09-30 RX ADMIN — HYDROCODONE BITARTRATE AND ACETAMINOPHEN 1 TABLET: 10; 325 TABLET ORAL at 09:09

## 2020-09-30 RX ADMIN — KETOROLAC TROMETHAMINE 30 MG: 30 INJECTION, SOLUTION INTRAMUSCULAR at 11:09

## 2020-09-30 RX ADMIN — MAGNESIUM HYDROXIDE/ALUMINUM HYDROXICE/SIMETHICONE 30 ML: 120; 1200; 1200 SUSPENSION ORAL at 04:09

## 2020-09-30 RX ADMIN — INDOMETHACIN 50 MG: 25 CAPSULE ORAL at 04:09

## 2020-09-30 RX ADMIN — MAGNESIUM HYDROXIDE/ALUMINUM HYDROXICE/SIMETHICONE 30 ML: 120; 1200; 1200 SUSPENSION ORAL at 09:09

## 2020-09-30 RX ADMIN — KETOROLAC TROMETHAMINE 30 MG: 30 INJECTION, SOLUTION INTRAMUSCULAR at 05:09

## 2020-09-30 NOTE — PROGRESS NOTES
Cardiology  Progress Notes            Subjective:  The pleuritic chest pain is much better.  He had a better night    Vital Signs:  Vitals:    09/30/20 1500 09/30/20 1600 09/30/20 1627 09/30/20 1800   BP: 117/65      BP Location: Left arm      Patient Position: Lying      Pulse: (!) 56 84  74   Resp: 16  18    Temp: 97.6 °F (36.4 °C)      TempSrc: Oral      SpO2: 99%      Weight:       Height:           Physical Exam:  Chest clear  Heart regular.  No murmur or gallop  The abdomen is soft and nontender the bowel sounds are normal  Extremities are warm    Laboratory:  CBC:   Recent Labs   Lab 09/29/20 0454   WBC 9.03   RBC 4.88   HGB 14.4   HCT 43.3      MCV 89   MCH 29.5   MCHC 33.3     BMP:   Recent Labs   Lab 09/28/20 0225   *      K 3.5      CO2 20*   BUN 13   CREATININE 0.9   CALCIUM 9.0     CMP:   Recent Labs   Lab 09/28/20 0225   *   CALCIUM 9.0   ALBUMIN 3.1*   PROT 6.1      K 3.5   CO2 20*      BUN 13   CREATININE 0.9   ALKPHOS 62   ALT 26   *   BILITOT 0.4     LFTs:   Recent Labs   Lab 09/28/20 0225   ALT 26   *   ALKPHOS 62   BILITOT 0.4   PROT 6.1   ALBUMIN 3.1*     Coagulation: No results for input(s): PT, INR, APTT in the last 168 hours.  Cardiac markers:   Recent Labs   Lab 09/30/20 0452   TROPONINI 21.148*       Imaging:  X-Ray Chest PA And Lateral   Final Result      No radiographic evidence of acute intrathoracic process.         Electronically signed by: Checo Munoz MD   Date:    09/28/2020   Time:    02:51      NM Myocardial Perfusion Spect Multi Exer    (Results Pending)     EKG shows regression of the ST segment elevation, now T-wave inversions in the lateral precordial leads    Problems:   Pericarditis  Elevated troponin suggesting myocarditis    Lexiscan Cardiolite test in the morning.        Plan of Care: See Orders          Yossi Cerna

## 2020-10-01 PROCEDURE — 25000003 PHARM REV CODE 250: Performed by: EMERGENCY MEDICINE

## 2020-10-01 PROCEDURE — 63600175 PHARM REV CODE 636 W HCPCS: Performed by: INTERNAL MEDICINE

## 2020-10-01 PROCEDURE — 11000001 HC ACUTE MED/SURG PRIVATE ROOM

## 2020-10-01 PROCEDURE — 93010 ELECTROCARDIOGRAM REPORT: CPT | Mod: ,,, | Performed by: INTERNAL MEDICINE

## 2020-10-01 PROCEDURE — 25000003 PHARM REV CODE 250: Performed by: INTERNAL MEDICINE

## 2020-10-01 PROCEDURE — 63600175 PHARM REV CODE 636 W HCPCS: Performed by: EMERGENCY MEDICINE

## 2020-10-01 PROCEDURE — 93010 EKG 12-LEAD: ICD-10-PCS | Mod: ,,, | Performed by: INTERNAL MEDICINE

## 2020-10-01 PROCEDURE — 94761 N-INVAS EAR/PLS OXIMETRY MLT: CPT

## 2020-10-01 PROCEDURE — C9113 INJ PANTOPRAZOLE SODIUM, VIA: HCPCS | Performed by: EMERGENCY MEDICINE

## 2020-10-01 PROCEDURE — 93005 ELECTROCARDIOGRAM TRACING: CPT

## 2020-10-01 RX ORDER — PANTOPRAZOLE SODIUM 40 MG/1
40 TABLET, DELAYED RELEASE ORAL DAILY
Status: DISCONTINUED | OUTPATIENT
Start: 2020-10-02 | End: 2020-10-02 | Stop reason: HOSPADM

## 2020-10-01 RX ORDER — PREDNISONE 20 MG/1
20 TABLET ORAL 2 TIMES DAILY
Status: DISCONTINUED | OUTPATIENT
Start: 2020-10-01 | End: 2020-10-02 | Stop reason: HOSPADM

## 2020-10-01 RX ADMIN — TRAMADOL HYDROCHLORIDE 50 MG: 50 TABLET, FILM COATED ORAL at 01:10

## 2020-10-01 RX ADMIN — COLCHICINE 0.6 MG: 0.6 TABLET, FILM COATED ORAL at 09:10

## 2020-10-01 RX ADMIN — KETOROLAC TROMETHAMINE 30 MG: 30 INJECTION, SOLUTION INTRAMUSCULAR at 01:10

## 2020-10-01 RX ADMIN — HYDROCODONE BITARTRATE AND ACETAMINOPHEN 1 TABLET: 10; 325 TABLET ORAL at 09:10

## 2020-10-01 RX ADMIN — PANTOPRAZOLE SODIUM 40 MG: 40 INJECTION, POWDER, LYOPHILIZED, FOR SOLUTION INTRAVENOUS at 08:10

## 2020-10-01 RX ADMIN — PREDNISONE 20 MG: 20 TABLET ORAL at 12:10

## 2020-10-01 RX ADMIN — INDOMETHACIN 50 MG: 25 CAPSULE ORAL at 08:10

## 2020-10-01 RX ADMIN — KETOROLAC TROMETHAMINE 30 MG: 30 INJECTION, SOLUTION INTRAMUSCULAR at 12:10

## 2020-10-01 RX ADMIN — KETOROLAC TROMETHAMINE 30 MG: 30 INJECTION, SOLUTION INTRAMUSCULAR at 05:10

## 2020-10-01 RX ADMIN — INDOMETHACIN 50 MG: 25 CAPSULE ORAL at 12:10

## 2020-10-01 RX ADMIN — MAGNESIUM HYDROXIDE/ALUMINUM HYDROXICE/SIMETHICONE 30 ML: 120; 1200; 1200 SUSPENSION ORAL at 12:10

## 2020-10-01 RX ADMIN — MAGNESIUM HYDROXIDE/ALUMINUM HYDROXICE/SIMETHICONE 30 ML: 120; 1200; 1200 SUSPENSION ORAL at 09:10

## 2020-10-01 RX ADMIN — MORPHINE SULFATE 5 MG: 2 INJECTION, SOLUTION INTRAMUSCULAR; INTRAVENOUS at 08:10

## 2020-10-01 RX ADMIN — PREDNISONE 20 MG: 20 TABLET ORAL at 09:10

## 2020-10-01 RX ADMIN — INDOMETHACIN 50 MG: 25 CAPSULE ORAL at 05:10

## 2020-10-01 RX ADMIN — COLCHICINE 0.6 MG: 0.6 TABLET, FILM COATED ORAL at 08:10

## 2020-10-01 RX ADMIN — MAGNESIUM HYDROXIDE/ALUMINUM HYDROXICE/SIMETHICONE 30 ML: 120; 1200; 1200 SUSPENSION ORAL at 05:10

## 2020-10-01 NOTE — PROGRESS NOTES
"Cardiology  Progress Notes            Subjective:  I no longer have any chest pains.  I feel tired and weak.  No shortness of breath and no cough.    Vital Signs:  Vitals:    10/01/20 1200 10/01/20 1235 10/01/20 1442 10/01/20 1600   BP:  115/60 (!) 120/58    BP Location:  Left arm Left arm    Patient Position:  Lying Lying    Pulse: 80 89 79 76   Resp:  18 16    Temp:  98.2 °F (36.8 °C) 98 °F (36.7 °C)    TempSrc:  Oral Oral    SpO2:  98% 99%    Weight:  78 kg (172 lb)     Height:  5' 9" (1.753 m)         Physical Exam:  Chest clear  Heart regular.  No murmur or gallop or rub.  Extremities are warm.    Laboratory:  CBC:   Recent Labs   Lab 09/29/20  0454   WBC 9.03   RBC 4.88   HGB 14.4   HCT 43.3      MCV 89   MCH 29.5   MCHC 33.3     BMP:   Recent Labs   Lab 09/28/20  0225   *      K 3.5      CO2 20*   BUN 13   CREATININE 0.9   CALCIUM 9.0     CMP:   Recent Labs   Lab 09/28/20  0225   *   CALCIUM 9.0   ALBUMIN 3.1*   PROT 6.1      K 3.5   CO2 20*      BUN 13   CREATININE 0.9   ALKPHOS 62   ALT 26   *   BILITOT 0.4     LFTs:   Recent Labs   Lab 09/28/20  0225   ALT 26   *   ALKPHOS 62   BILITOT 0.4   PROT 6.1   ALBUMIN 3.1*     Coagulation: No results for input(s): PT, INR, APTT in the last 168 hours.  Cardiac markers:   Recent Labs   Lab 09/30/20  0452   TROPONINI 21.148*       Imaging:  X-Ray Chest PA And Lateral   Final Result      No radiographic evidence of acute intrathoracic process.         Electronically signed by: Checo Munoz MD   Date:    09/28/2020   Time:    02:51      NM Myocardial Perfusion Spect Multi Exer    (Results Pending)         Problems:   Acute pericarditis, elevated troponin and CPK suggests myocarditis.      Plan of Care:  Repeat echocardiogram  Await nuclear Lexiscan test  rx prednisone          Yossi Cerna  "

## 2020-10-01 NOTE — PROGRESS NOTES
Notified by Mara in Nuclear Medicine, that Stress Test will be performed on 10/2. OK for pt to eat breakfast tomorrow, 10/2.NPO after 9am. Will send for pt around noon.

## 2020-10-02 VITALS
BODY MASS INDEX: 25.48 KG/M2 | TEMPERATURE: 98 F | OXYGEN SATURATION: 98 % | RESPIRATION RATE: 14 BRPM | HEIGHT: 69 IN | WEIGHT: 172 LBS | SYSTOLIC BLOOD PRESSURE: 118 MMHG | DIASTOLIC BLOOD PRESSURE: 59 MMHG | HEART RATE: 83 BPM

## 2020-10-02 LAB
AV INDEX (PROSTH): 0.55
AV MEAN GRADIENT: 4 MMHG
AV PEAK GRADIENT: 7 MMHG
AV VALVE AREA: 2.05 CM2
AV VELOCITY RATIO: 0.63
BSA FOR ECHO PROCEDURE: 1.95 M2
CV ECHO LV RWT: 0.25 CM
DOP CALC AO PEAK VEL: 1.34 M/S
DOP CALC AO VTI: 29.07 CM
DOP CALC LVOT AREA: 3.7 CM2
DOP CALC LVOT DIAMETER: 2.18 CM
DOP CALC LVOT PEAK VEL: 0.85 M/S
DOP CALC LVOT STROKE VOLUME: 59.58 CM3
DOP CALCLVOT PEAK VEL VTI: 15.97 CM
E WAVE DECELERATION TIME: 209.14 MSEC
E/A RATIO: 1.44
E/E' RATIO: 5.94 M/S
ECHO LV POSTERIOR WALL: 0.67 CM (ref 0.6–1.1)
FRACTIONAL SHORTENING: 32 % (ref 28–44)
INTERVENTRICULAR SEPTUM: 0.67 CM (ref 0.6–1.1)
IVRT: 44.36 MSEC
LA MAJOR: 4.31 CM
LA MINOR: 4.91 CM
LA WIDTH: 3.74 CM
LEFT ATRIUM SIZE: 3.11 CM
LEFT ATRIUM VOLUME INDEX MOD: 27.9 ML/M2
LEFT ATRIUM VOLUME INDEX: 23.4 ML/M2
LEFT ATRIUM VOLUME MOD: 54 CM3
LEFT ATRIUM VOLUME: 45.38 CM3
LEFT INTERNAL DIMENSION IN SYSTOLE: 3.65 CM (ref 2.1–4)
LEFT VENTRICLE DIASTOLIC VOLUME INDEX: 72.72 ML/M2
LEFT VENTRICLE DIASTOLIC VOLUME: 140.89 ML
LEFT VENTRICLE MASS INDEX: 64 G/M2
LEFT VENTRICLE SYSTOLIC VOLUME INDEX: 29 ML/M2
LEFT VENTRICLE SYSTOLIC VOLUME: 56.17 ML
LEFT VENTRICULAR INTERNAL DIMENSION IN DIASTOLE: 5.39 CM (ref 3.5–6)
LEFT VENTRICULAR MASS: 123.93 G
LV LATERAL E/E' RATIO: 5.75 M/S
LV SEPTAL E/E' RATIO: 6.13 M/S
MV PEAK A VEL: 0.64 M/S
MV PEAK E VEL: 0.92 M/S
MV STENOSIS PRESSURE HALF TIME: 60.65 MS
MV VALVE AREA P 1/2 METHOD: 3.63 CM2
PISA TR MAX VEL: 2.08 M/S
PULM VEIN S/D RATIO: 1.1
PV PEAK D VEL: 0.59 M/S
PV PEAK S VEL: 0.65 M/S
PV PEAK VELOCITY: 1.25 CM/S
RA MAJOR: 4.65 CM
RA WIDTH: 3.31 CM
SINUS: 2.54 CM
TDI LATERAL: 0.16 M/S
TDI SEPTAL: 0.15 M/S
TDI: 0.16 M/S
TR MAX PG: 17 MMHG
TRICUSPID ANNULAR PLANE SYSTOLIC EXCURSION: 2.23 CM

## 2020-10-02 PROCEDURE — 63600175 PHARM REV CODE 636 W HCPCS: Performed by: INTERNAL MEDICINE

## 2020-10-02 PROCEDURE — 93005 ELECTROCARDIOGRAM TRACING: CPT

## 2020-10-02 PROCEDURE — 93010 EKG 12-LEAD: ICD-10-PCS | Mod: ,,, | Performed by: INTERNAL MEDICINE

## 2020-10-02 PROCEDURE — 25000003 PHARM REV CODE 250: Performed by: INTERNAL MEDICINE

## 2020-10-02 PROCEDURE — 93010 ELECTROCARDIOGRAM REPORT: CPT | Mod: ,,, | Performed by: INTERNAL MEDICINE

## 2020-10-02 PROCEDURE — 94761 N-INVAS EAR/PLS OXIMETRY MLT: CPT

## 2020-10-02 PROCEDURE — 25000003 PHARM REV CODE 250: Performed by: EMERGENCY MEDICINE

## 2020-10-02 NOTE — PLAN OF CARE
CM met with pt for discharge planning reassessment. Pt is scheduled for nuclear stress test this am, and a repeat echo.    Pending results of stress, pt may be ready for discharge.    Pt states no CM needs for discharge.    CM to follow for plans and arrangemetns.   10/02/20 0824   Discharge Reassessment   Assessment Type Discharge Planning Reassessment   Provided patient/caregiver education on the expected discharge date and the discharge plan Yes   Do you have any problems affording any of your prescribed medications? No   Discharge Plan A Home   Discharge Plan B Home   DME Needed Upon Discharge  none   Anticipated Discharge Disposition Home   Can the patient/caregiver answer the patient profile reliably? Yes, cognitively intact   How does the patient rate their overall health at the present time? Excellent   How often would a person be available to care for the patient? Whenever needed   Number of comorbid conditions (as recorded on the chart) None   During the past month, has the patient often been bothered by feeling down, depressed or hopeless? No   During the past month, has the patient often been bothered by little interest or pleasure in doing things? No

## 2020-10-02 NOTE — PLAN OF CARE
Discharge instructions given to patient.  Patient verbalized understanding and had no further questions.  Patient's IV removed and vital signs within normal limits.  Patient now awaiting on ride from friend.  Will continue to monitor.

## 2020-10-02 NOTE — NURSING
"Patient currently refusing all medications.  Patient states " I want to see if these medications are masking my pain".  Patient is not having any pain at this time.  Physician notified, no new orders at this time.  Will continue to monitor.   "

## 2020-10-02 NOTE — DISCHARGE SUMMARY
[facility]  Cardiology  Discharge Summary      Patient Name: Cornelius Dalal    Admission Date: 9/28/2020    Discharge Date and Time:10/2/20   Final Diagnoses: Acute idiopathic pericarditis   Principal Problem: Acute pericarditis    HPI: Admitted with C/O fever and sore throat followed by pleuritic chest pains  Impression on Admission: Acute pericarditis  Hospital Course:EKG suggested pericarditis. Troponins were elevated. Echo showed trivial pericardial effusion. Good LV systolic function.  Started Rx with Colchicine and Indocin. Require narcotic analgesia for pain control for 2 days. Planned nuclear stress but the camera was broken.  Gave one dose of Prednisone 20 mg po and the following morning dramatic improvement in pain and the ekg returned to normal.  Patient did not want to take any further medications.  Upon discharge, advised prn Ibuprofen.  Disposition:Discharge, for OP follow up in a week.  Follow up/Patient Instructions:    Medications:  None  Discharge Procedure Orders   Call MD for:  temperature >100.4     Call MD for:  persistent nausea and vomiting or diarrhea     Call MD for:  severe uncontrolled pain     Call MD for:  redness, tenderness, or signs of infection (pain, swelling, redness, odor or green/yellow discharge around incision site)     Call MD for:  difficulty breathing or increased cough     Follow-up Information     Yossi Cerna MD In 1 week.    Specialty: Cardiology  Contact information:  [address]  [facility] [address] [phone]                   Condition upon Discharge: good        Yossi Cerna MD

## 2020-10-06 ENCOUNTER — INITIAL CONSULT (OUTPATIENT)
Dept: CARDIOLOGY | Facility: CLINIC | Age: 20
End: 2020-10-06
Payer: OTHER GOVERNMENT

## 2020-10-06 VITALS
OXYGEN SATURATION: 97 % | DIASTOLIC BLOOD PRESSURE: 58 MMHG | HEIGHT: 69 IN | HEART RATE: 84 BPM | SYSTOLIC BLOOD PRESSURE: 124 MMHG | BODY MASS INDEX: 24.82 KG/M2 | WEIGHT: 167.56 LBS

## 2020-10-06 DIAGNOSIS — I30.0 ACUTE IDIOPATHIC PERICARDITIS: Primary | ICD-10-CM

## 2020-10-06 PROCEDURE — 99205 OFFICE O/P NEW HI 60 MIN: CPT | Mod: S$GLB,,, | Performed by: INTERNAL MEDICINE

## 2020-10-06 PROCEDURE — 99205 PR OFFICE/OUTPT VISIT, NEW, LEVL V, 60-74 MIN: ICD-10-PCS | Mod: S$GLB,,, | Performed by: INTERNAL MEDICINE

## 2020-10-06 PROCEDURE — 99999 PR PBB SHADOW E&M-EST. PATIENT-LVL III: ICD-10-PCS | Mod: PBBFAC,,, | Performed by: INTERNAL MEDICINE

## 2020-10-06 PROCEDURE — 99213 OFFICE O/P EST LOW 20 MIN: CPT | Mod: PBBFAC | Performed by: INTERNAL MEDICINE

## 2020-10-06 PROCEDURE — 99999 PR PBB SHADOW E&M-EST. PATIENT-LVL III: CPT | Mod: PBBFAC,,, | Performed by: INTERNAL MEDICINE

## 2020-10-06 RX ORDER — COLCHICINE 0.6 MG/1
0.6 TABLET ORAL 2 TIMES DAILY
Qty: 60 TABLET | Refills: 0 | Status: SHIPPED | OUTPATIENT
Start: 2020-10-06 | End: 2020-11-10 | Stop reason: SDUPTHER

## 2020-10-06 NOTE — PROGRESS NOTES
Patient MRN: [id number]  Patient : 2000  PCP: Primary Doctor No    CC: No chief complaint on file.  Ref:  Miguel Harrison    History of Present Illness:   Cornelius Dalal is a 20 y.o. y.o. male who presents for consultation for pericarditis.     This is a new patient for me presenting with an established problem of pericarditis. He has no other medical issues.  He was admitted to [facility] from  to 10/2.  He was treated with indomethacin, prednisone and colchicine.  He was discharged home without any medications.  He notes his chest pain has resolved but still notes a pressure-like sensation in his chest.  He denies shortness of breath.  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  He notes that he has been on physical restrictions since his discharge.  Of note he was cleared to do weightlifting activities but he was not allowed to do any cardio therapy.  He notes having some palpitations but denies lightheadedness, dizziness, chest pain, shortness of breath.  This is his 1st episode of pericarditis.  He notes having a viral URI type symptoms prior.  He denies having sick contacts prior to this event .He is originally from [facility].    This is the extent of the patient's complaints at this time. Patient queried and denies any further complaint.     Past Medical History:   History reviewed. No pertinent past medical history.    Past Surgical History:   History reviewed. No pertinent surgical history.    Social History:     Social History     Tobacco Use    Smoking status: Never Smoker    Smokeless tobacco: Never Used   Substance Use Topics    Alcohol use: Yes     Alcohol/week: 2.0 standard drinks     Types: 1 Shots of liquor, 1 Cans of beer per week     Comment: some weekend       Family History:   History reviewed. No pertinent family history.    Allergies:   Review of patient's allergies indicates:  No Known Allergies    Review of Systems:   ROS    All other systems reviewed and  "are negative.   Medications:     No current outpatient medications on file prior to visit.     No current facility-administered medications on file prior to visit.        Physical Exam:   BP (!) 124/58 (BP Location: Right arm, Patient Position: Sitting, BP Method: Large (Automatic))   Pulse 84   Ht 5' 9" (1.753 m)   Wt 76 kg (167 lb 8.8 oz)   SpO2 97%   BMI 24.74 kg/m²   Physical Exam    Labs:     Lab Results   Component Value Date    WBC 9.03 09/29/2020    HGB 14.4 09/29/2020    HCT 43.3 09/29/2020     09/29/2020    GRAN 5.5 09/29/2020    GRAN 60.7 09/29/2020        Chemistry        Component Value Date/Time     09/28/2020 0225    K 3.5 09/28/2020 0225     09/28/2020 0225    CO2 20 (L) 09/28/2020 0225    BUN 13 09/28/2020 0225    CREATININE 0.9 09/28/2020 0225     (H) 09/28/2020 0225        Component Value Date/Time    CALCIUM 9.0 09/28/2020 0225    ALKPHOS 62 09/28/2020 0225     (H) 09/28/2020 0225    ALT 26 09/28/2020 0225    BILITOT 0.4 09/28/2020 0225    ESTGFRAFRICA >60 09/28/2020 0225    EGFRNONAA >60 09/28/2020 0225          Lab Results   Component Value Date    ALT 26 09/28/2020     (H) 09/28/2020    ALKPHOS 62 09/28/2020    BILITOT 0.4 09/28/2020      No results found for: HGBA1C  Lab Results   Component Value Date     (H) 09/28/2020     No results found for: CHOL, HDL, LDLCALC, TRIG  No results found for: INR, PROTIME     I have reviewed all pertinent labs within the past 24 hours.    Cardiovascular Imaging:   Echo: No results found for: EF  2D echo with color flow doppler: No results found for this or any previous visit. and Transthoracic echo (TTE) complete (Cupid Only):   Results for orders placed or performed during the hospital encounter of 09/28/20   Echo Color Flow Doppler? Yes   Result Value Ref Range    AV mean gradient 4 mmHg    Ao peak vijaya 1.34 m/s    Ao VTI 29.07 cm    IVRT 44.36 msec    IVS 0.67 0.6 - 1.1 cm    LA size 3.11 cm    Left Atrium " Major Axis 4.31 cm    Left Atrium Minor Axis 4.91 cm    LVIDd 5.39 3.5 - 6.0 cm    LVIDs 3.65 2.1 - 4.0 cm    LVOT diameter 2.18 cm    LVOT peak VTI 15.97 cm    Posterior Wall 0.67 0.6 - 1.1 cm    MV Peak A Vineet 0.64 m/s    E wave decelartion time 209.14 msec    MV Peak E Vineet 0.92 m/s    PV Peak D Vineet 0.59 m/s    PV Peak S Vineet 0.65 m/s    RA Major Axis 4.65 cm    RA Width 3.31 cm    Sinus 2.54 cm    TAPSE 2.23 cm    TR Max Vineet 2.08 m/s    TDI LATERAL 0.16 m/s    TDI SEPTAL 0.15 m/s    LA WIDTH 3.74 cm    PV PEAK VELOCITY 1.25 cm/s    MV stenosis pressure 1/2 time 60.65 ms    LV Diastolic Volume 140.89 mL    LV Systolic Volume 56.17 mL    LVOT peak vineet 0.85 m/s    LV LATERAL E/E' RATIO 5.75 m/s    LV SEPTAL E/E' RATIO 6.13 m/s    FS 32 %    LA volume 45.38 cm3    LV mass 123.93 g    Left Ventricle Relative Wall Thickness 0.25 cm    AV valve area 2.05 cm2    AV Velocity Ratio 0.63     AV index (prosthetic) 0.55     MV valve area p 1/2 method 3.63 cm2    E/A ratio 1.44     Mean e' 0.16 m/s    Pulm vein S/D ratio 1.10     LVOT area 3.7 cm2    LVOT stroke volume 59.58 cm3    AV peak gradient 7 mmHg    E/E' ratio 5.94 m/s    LV Systolic Volume Index 29.0 mL/m2    LV Diastolic Volume Index 72.72 mL/m2    LA Volume Index 23.4 mL/m2    LV Mass Index 64 g/m2    Triscuspid Valve Regurgitation Peak Gradient 17 mmHg    BSA 1.95 m2    LA Volume Index (Mod) 27.9 mL/m2    LA volume (mod) 54.00 cm3    Narrative    · The left ventricle is normal in size with normal systolic function. The   estimated ejection fraction is 60%.  · Normal left ventricular diastolic function.  · Normal right ventricular systolic function.  · Mild mitral regurgitation.  · Trivial posterior pericardial effusion.          Test(s) Reviewed  I have reviewed the following in detail:  [] Stress test   [] Angiography   [x] Echocardiogram   [x] Labs   [x] Other:  EKG     Assessment & Plan:   Acute idiopathic pericarditis  -  Likely viral pericarditis  - start  colchicine 0.6 mg p.o. b.i.d.  Start ibuprofen over-the-counter 600 mg p.o. b.i.d. t.i.d.  Limit physical activity at this time.  He is not currently cleared to resume swimming activities  Return to clinic in 1 month with CBC, BMP, ESR, CRP.  Repeat echo in 1 month for resolution of trivial pericardial effusion  Currently remains symptomatic with pressure-like sensation in his chest.  Currently afebrile.  Of note he is COVID negative    -RTC in 1 month       Hussein Ruiz - Pager# [phone]  10/6/2020  5:07 PM  Cardiovascular Fellow  [facility]       Interventional Cardiology Staff  I have personally taken the history and examined this patient. I have discussed and agree with the resident's findings and plan as documented in the resident's note.    Giorgio Green

## 2020-10-06 NOTE — LETTER
October 7, 2020      Miguel Harrison, DO  [address]  [facility] [address] [address]           [facility] Cardiology [facility] 3rd Fl  [address]  [place] [address]  Phone: [phone]          Patient: Cornelius Dalal   MR Number: [id number]   YOB: 2000   Date of Visit: 10/6/2020       Dear Dr. Miguel Harrison:    Thank you for referring Cornelius Dalal to me for evaluation. Attached you will find relevant portions of my assessment and plan of care.    If you have questions, please do not hesitate to call me. I look forward to following Cornelius Dalal along with you.    Sincerely,    Giorgio Green MD    Enclosure  CC:  No Recipients    If you would like to receive this communication electronically, please contact [email or web address] or [phone] to request more information on [employer] Link access.    For providers and/or their staff who would like to refer a patient to [facility], please contact us through our one-stop-shop provider referral line, [facility], at [phone].    If you feel you have received this communication in error or would no longer like to receive these types of communications, please e-mail [email or web address]

## 2020-10-14 ENCOUNTER — TELEPHONE (OUTPATIENT)
Dept: CARDIAC CATH/INVASIVE PROCEDURES | Facility: HOSPITAL | Age: 20
End: 2020-10-14

## 2020-10-14 NOTE — TELEPHONE ENCOUNTER
Contacted patient for 1 week follow-up for pericarditis.    He notes that his chest pressure sensation has resolved he overall is feeling well.    His current therapy includes ibuprofen 600 mg TID and colchicine 0.6 mg BID in addition to a PPI.    Instructed patient to begin tapering ibuprofen. He will decrease ibuprofen to 1 tablet (200 mg) 3 times a day for the next week and then stop.     Plan to continue colchicine for a total of 3 months. He has a follow-up echo appointment and labs on October 2th8 and follow-up clinic appointment on November 3rd.     No further questions or concerns at this time.

## 2020-11-03 ENCOUNTER — HOSPITAL ENCOUNTER (OUTPATIENT)
Dept: CARDIOLOGY | Facility: HOSPITAL | Age: 20
Discharge: HOME | End: 2020-11-03
Attending: INTERNAL MEDICINE
Payer: COMMERCIAL

## 2020-11-03 VITALS
WEIGHT: 167 LBS | DIASTOLIC BLOOD PRESSURE: 64 MMHG | SYSTOLIC BLOOD PRESSURE: 120 MMHG | HEIGHT: 69 IN | BODY MASS INDEX: 24.73 KG/M2 | HEART RATE: 66 BPM

## 2020-11-03 DIAGNOSIS — I30.0 ACUTE IDIOPATHIC PERICARDITIS: ICD-10-CM

## 2020-11-03 LAB
ASCENDING AORTA: 2.18 CM
AV INDEX (PROSTH): 0.8
AV MEAN GRADIENT: 2 MMHG
AV PEAK GRADIENT: 4 MMHG
AV VALVE AREA: 3.03 CM2
AV VELOCITY RATIO: 0.83
BSA FOR ECHO PROCEDURE: 1.92 M2
CV ECHO LV RWT: 0.33 CM
DOP CALC AO PEAK VEL: 1.02 M/S
DOP CALC AO VTI: 21.24 CM
DOP CALC LVOT AREA: 3.8 CM2
DOP CALC LVOT DIAMETER: 2.19 CM
DOP CALC LVOT PEAK VEL: 0.85 M/S
DOP CALC LVOT STROKE VOLUME: 64.34 CM3
DOP CALCLVOT PEAK VEL VTI: 17.09 CM
E WAVE DECELERATION TIME: 262.32 MSEC
E/A RATIO: 2.14
E/E' RATIO: 4.55 M/S
ECHO LV POSTERIOR WALL: 0.81 CM (ref 0.6–1.1)
FRACTIONAL SHORTENING: 27 % (ref 28–44)
INTERVENTRICULAR SEPTUM: 0.7 CM (ref 0.6–1.1)
IVRT: 99.9 MSEC
LA MAJOR: 4.68 CM
LA MINOR: 4.87 CM
LA WIDTH: 3.16 CM
LEFT ATRIUM SIZE: 3.24 CM
LEFT ATRIUM VOLUME INDEX: 21.7 ML/M2
LEFT ATRIUM VOLUME: 41.54 CM3
LEFT INTERNAL DIMENSION IN SYSTOLE: 3.57 CM (ref 2.1–4)
LEFT VENTRICLE DIASTOLIC VOLUME INDEX: 58.41 ML/M2
LEFT VENTRICLE DIASTOLIC VOLUME: 111.76 ML
LEFT VENTRICLE MASS INDEX: 63 G/M2
LEFT VENTRICLE SYSTOLIC VOLUME INDEX: 27.9 ML/M2
LEFT VENTRICLE SYSTOLIC VOLUME: 53.47 ML
LEFT VENTRICULAR INTERNAL DIMENSION IN DIASTOLE: 4.88 CM (ref 3.5–6)
LEFT VENTRICULAR MASS: 120.99 G
LV LATERAL E/E' RATIO: 4.41 M/S
LV SEPTAL E/E' RATIO: 4.69 M/S
MV PEAK A VEL: 0.35 M/S
MV PEAK E VEL: 0.75 M/S
MV STENOSIS PRESSURE HALF TIME: 76.07 MS
MV VALVE AREA P 1/2 METHOD: 2.89 CM2
PISA TR MAX VEL: 1.4 M/S
PULM VEIN S/D RATIO: 0.7
PV PEAK D VEL: 0.53 M/S
PV PEAK S VEL: 0.37 M/S
RA MAJOR: 4.91 CM
RA PRESSURE: 3 MMHG
RA WIDTH: 4.11 CM
RIGHT VENTRICULAR END-DIASTOLIC DIMENSION: 4.06 CM
RV TISSUE DOPPLER FREE WALL SYSTOLIC VELOCITY 1 (APICAL 4 CHAMBER VIEW): 10.84 CM/S
SINUS: 2.96 CM
STJ: 2.54 CM
TDI LATERAL: 0.17 M/S
TDI SEPTAL: 0.16 M/S
TDI: 0.17 M/S
TR MAX PG: 8 MMHG
TRICUSPID ANNULAR PLANE SYSTOLIC EXCURSION: 2.14 CM
TV REST PULMONARY ARTERY PRESSURE: 11 MMHG

## 2020-11-03 PROCEDURE — 93306 ECHO (CUPID ONLY): ICD-10-PCS | Mod: 26,,, | Performed by: INTERNAL MEDICINE

## 2020-11-03 PROCEDURE — 93306 TTE W/DOPPLER COMPLETE: CPT | Mod: 26,,, | Performed by: INTERNAL MEDICINE

## 2020-11-03 PROCEDURE — 93306 TTE W/DOPPLER COMPLETE: CPT

## 2020-11-10 ENCOUNTER — OFFICE VISIT (OUTPATIENT)
Dept: CARDIOLOGY | Facility: CLINIC | Age: 20
End: 2020-11-10
Payer: COMMERCIAL

## 2020-11-10 VITALS — DIASTOLIC BLOOD PRESSURE: 62 MMHG | SYSTOLIC BLOOD PRESSURE: 127 MMHG | HEART RATE: 77 BPM | OXYGEN SATURATION: 98 %

## 2020-11-10 DIAGNOSIS — Z86.79 HISTORY OF PERICARDITIS: Primary | ICD-10-CM

## 2020-11-10 DIAGNOSIS — I30.0 ACUTE IDIOPATHIC PERICARDITIS: ICD-10-CM

## 2020-11-10 PROCEDURE — 99999 PR PBB SHADOW E&M-EST. PATIENT-LVL III: CPT | Mod: PBBFAC,,, | Performed by: INTERNAL MEDICINE

## 2020-11-10 PROCEDURE — 99212 OFFICE O/P EST SF 10 MIN: CPT | Mod: S$GLB,,, | Performed by: INTERNAL MEDICINE

## 2020-11-10 PROCEDURE — 99999 PR PBB SHADOW E&M-EST. PATIENT-LVL III: ICD-10-PCS | Mod: PBBFAC,,, | Performed by: INTERNAL MEDICINE

## 2020-11-10 PROCEDURE — 99213 OFFICE O/P EST LOW 20 MIN: CPT | Mod: PBBFAC | Performed by: INTERNAL MEDICINE

## 2020-11-10 PROCEDURE — 99212 PR OFFICE/OUTPT VISIT, EST, LEVL II, 10-19 MIN: ICD-10-PCS | Mod: S$GLB,,, | Performed by: INTERNAL MEDICINE

## 2020-11-10 RX ORDER — COLCHICINE 0.6 MG/1
0.6 TABLET ORAL 2 TIMES DAILY
Qty: 120 TABLET | Refills: 0 | Status: SHIPPED | OUTPATIENT
Start: 2020-11-10 | End: 2021-01-09

## 2020-11-10 NOTE — PROGRESS NOTES
"    Interventional Cardiology Clinic Note  Reason for Visit: Follow up for pericarditis  Last Clinic Visit: 10/6/2020    HPI:   Cornelius Dalal is a healthy 20 y.o. male who presents for follow up of pericarditis      He was admitted to [facility] from 09/28 to 10/2 with acute pericarditis. It was his first episode and preceded by viral URI. He was treated with indomethacin, prednisone and colchicine. At his last visit he was started on Colchicine and Ibuprofen. After a couple of weeks on the Ibuprofen, someone from [facility] called to check on him and told him he could stop the Ibuprofen. He has been taking the Colchicine. He states his original chest pressure has resolved, but about a week after starting the medications, he developed a "pulling" left sided chest pain that occurs once daily, unrelated to physical activity, and lasts anywhere from 2-10 minutes. Isn't getting better of worse.  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  He notes that he has been on physical restrictions since his discharge and has been instructed not to do aerobic activity.  He has been going to the gym and walking and doing weight machines without symptoms. He notes having some palpitations but denies lightheadedness, dizziness, chest pain, shortness of breath.        His TTE from last week shows resolution of the trivial pericardial effusion and his labs show inflammatory markers are not within normal range.     ROS:    Constitution: Negative for decreased appetite, diaphoresis, fever, malaise/fatigue, weight gain and weight loss.   HENT: Negative for congestion, nosebleeds and sore throat.    Eyes: Negative for blurred vision, vision loss in left eye, vision loss in right eye and visual disturbance.   Cardiovascular: Negative forclaudication, dyspnea on exertion, leg swelling, near-syncope, orthopnea, paroxysmal nocturnal dyspnea and syncope. Positive for chest pain and palpitations.   Respiratory: Negative " for cough, hemoptysis, snoring, shortness of breath and wheezing..    Hematologic/Lymphatic: Negative for bleeding problem. Does not bruise/bleed easily.   Endocrine: Negative for polyuria  Musculoskeletal: Negative for muscle cramps and myalgias.   Gastrointestinal: Negative for abdominal pain, change in bowel habit, diarrhea, heartburn, hematemesis, hematochezia, melena, nausea and vomiting.   Neurological: Negative for extremity weakness or numbness, dizziness, headaches and light-headedness.   Psychiatric/Behavioral: Negative for depression.   Allergic/Immunologic: Negative for hives.   PMH:   History reviewed. No pertinent past medical history.  History reviewed. No pertinent surgical history.  Allergies:   Review of patient's allergies indicates:  No Known Allergies  Medications:     Current Outpatient Medications on File Prior to Visit   Medication Sig Dispense Refill    colchicine (COLCRYS) 0.6 mg tablet Take 1 tablet (0.6 mg total) by mouth 2 (two) times daily. 60 tablet 0     No current facility-administered medications on file prior to visit.      Social History:     Social History     Tobacco Use    Smoking status: Light Tobacco Smoker    Smokeless tobacco: Never Used   Substance Use Topics    Alcohol use: Yes     Alcohol/week: 2.0 standard drinks     Types: 1 Cans of beer, 1 Shots of liquor per week     Comment: some weekend     Family History:   History reviewed. No pertinent family history.  Physical Exam:   /62 (BP Location: Right arm, Patient Position: Sitting, BP Method: Large (Automatic))   Pulse 77   SpO2 98%      Physical Exam   Constitutional: He is oriented to person, place, and time and well-developed, well-nourished, and in no distress.   HENT:   Head: Normocephalic and atraumatic.   Eyes: Conjunctivae and EOM are normal. No scleral icterus.   Neck: Neck supple. No JVD present. No thyromegaly present.   Cardiovascular: Normal rate, regular rhythm, normal heart sounds and intact  distal pulses. Exam reveals no gallop and no friction rub.   No murmur heard.  Pulses:       Radial pulses are 2+ on the right side and 2+ on the left side.        Dorsalis pedis pulses are 2+ on the right side and 2+ on the left side.        Posterior tibial pulses are 2+ on the right side and 2+ on the left side.   Pulmonary/Chest: Effort normal and breath sounds normal. He has no wheezes. He has no rales. He exhibits no tenderness.   Abdominal: Soft. Bowel sounds are normal. He exhibits no distension. There is no abdominal tenderness.   Musculoskeletal:         General: No edema.   Neurological: He is alert and oriented to person, place, and time.   Skin: Skin is warm and dry. No rash noted. No cyanosis or erythema. No pallor. Nails show no clubbing.   Psychiatric: Affect normal.   Nursing note and vitals reviewed.       Labs:     Lab Results   Component Value Date     11/03/2020    K 3.9 11/03/2020     11/03/2020    CO2 27 11/03/2020    BUN 17 11/03/2020    CREATININE 1.0 11/03/2020    ANIONGAP 9 11/03/2020     No results found for: HGBA1C  Lab Results   Component Value Date     (H) 09/28/2020    Lab Results   Component Value Date    WBC 5.67 11/03/2020    HGB 16.3 11/03/2020    HCT 49.9 11/03/2020     11/03/2020    GRAN 3.2 11/03/2020    GRAN 57.2 11/03/2020     No results found for: CHOL, HDL, LDLCALC, TRIG       Imaging:   TTE 11/3/2020  · The left ventricle is normal in size with normal systolic function. The estimated ejection fraction is 63%.Normal left ventricular diastolic function.  · Normal right ventricular systolic function. .  · Mild pulmonic regurgitation.  · Mild right atrial enlargement.  · Normal central venous pressure (3 mmHg).  · The estimated PA systolic pressure is 11 mmHg.      Assessment:     1. History of pericarditis      Plan:     Pericarditis, likely viral   -TTE shows resolution of pericardial effusion. ESR and CRP now down to normal range.   -Pt still with  some lingering atypical symptoms  -Continue Colchicine 0.6 mg bid x 2 more months   -Ibuprofen over-the-counter 600 mg p.o. b.i.d. t.i.d. as needed for chest pain  -He is not currently cleared to resume strenuous aerobic exercise (ex. competitive athletics) at this point but can do light exercise in his free time -Return to clinic in 2 months to reassess symptoms     Follow up in 2 months.     Signed:  Laura Suero PA-C  Cardiology   [phone] - Interventional  [phone] - [facility]  11/10/2020 11:36 AM    Interventional Cardiology Staff  I have personally taken the history and examined this patient. I have discussed and agree with the resident's findings and plan as documented in the resident's note.     I Have recommended continue ibuprofen as needed.  I have asked patient to wait until 3 months of therapy is completed and he is asymptomatic at which time he can return to full competitive athletics.    Follow-up in 3 months with ETHELA

## 2021-09-27 ENCOUNTER — LAB VISIT (OUTPATIENT)
Dept: LAB | Facility: HOSPITAL | Age: 21
End: 2021-09-27
Attending: ORTHOPAEDIC SURGERY

## 2021-09-27 DIAGNOSIS — R07.9 CHEST PAIN, UNSPECIFIED TYPE: ICD-10-CM

## 2021-09-27 DIAGNOSIS — Z86.79 HISTORY OF PERICARDITIS: Primary | ICD-10-CM

## 2021-09-27 DIAGNOSIS — Z86.79 HISTORY OF PERICARDITIS: ICD-10-CM

## 2021-09-27 DIAGNOSIS — Z86.16 HISTORY OF COVID-19: ICD-10-CM

## 2021-09-27 LAB — TROPONIN I SERPL DL<=0.01 NG/ML-MCNC: 0.01 NG/ML (ref 0–0.03)

## 2021-09-27 PROCEDURE — 84484 ASSAY OF TROPONIN QUANT: CPT | Performed by: ORTHOPAEDIC SURGERY

## 2021-09-27 PROCEDURE — 36415 COLL VENOUS BLD VENIPUNCTURE: CPT | Mod: PN | Performed by: ORTHOPAEDIC SURGERY

## 2021-10-11 ENCOUNTER — ATHLETIC TRAINING SESSION (OUTPATIENT)
Dept: SPORTS MEDICINE | Facility: CLINIC | Age: 21
End: 2021-10-11

## 2021-10-13 ENCOUNTER — OFFICE VISIT (OUTPATIENT)
Dept: URGENT CARE | Facility: CLINIC | Age: 21
End: 2021-10-13

## 2021-10-13 VITALS
DIASTOLIC BLOOD PRESSURE: 71 MMHG | OXYGEN SATURATION: 98 % | RESPIRATION RATE: 16 BRPM | HEART RATE: 115 BPM | SYSTOLIC BLOOD PRESSURE: 129 MMHG | WEIGHT: 167.56 LBS | TEMPERATURE: 102 F | BODY MASS INDEX: 23.99 KG/M2 | HEIGHT: 70 IN

## 2021-10-13 DIAGNOSIS — R52 BODY ACHES: ICD-10-CM

## 2021-10-13 DIAGNOSIS — B34.9 VIRAL SYNDROME: Primary | ICD-10-CM

## 2021-10-13 DIAGNOSIS — R51.9 ACUTE NONINTRACTABLE HEADACHE, UNSPECIFIED HEADACHE TYPE: ICD-10-CM

## 2021-10-13 DIAGNOSIS — R50.9 FEVER, UNSPECIFIED FEVER CAUSE: ICD-10-CM

## 2021-10-13 DIAGNOSIS — J02.9 SORE THROAT: ICD-10-CM

## 2021-10-13 LAB
CTP QC/QA: YES
MOLECULAR STREP A: NEGATIVE
POC MOLECULAR INFLUENZA A AGN: NEGATIVE
POC MOLECULAR INFLUENZA B AGN: NEGATIVE
SARS-COV-2 RDRP RESP QL NAA+PROBE: NEGATIVE

## 2021-10-13 PROCEDURE — 99203 OFFICE O/P NEW LOW 30 MIN: CPT | Mod: S$GLB,,, | Performed by: PHYSICIAN ASSISTANT

## 2021-10-13 PROCEDURE — 87502 INFLUENZA DNA AMP PROBE: CPT | Mod: QW,S$GLB,, | Performed by: PHYSICIAN ASSISTANT

## 2021-10-13 PROCEDURE — U0002 COVID-19 LAB TEST NON-CDC: HCPCS | Mod: QW,S$GLB,, | Performed by: PHYSICIAN ASSISTANT

## 2021-10-13 PROCEDURE — 87502 POCT INFLUENZA A/B MOLECULAR: ICD-10-PCS | Mod: QW,S$GLB,, | Performed by: PHYSICIAN ASSISTANT

## 2021-10-13 PROCEDURE — 87651 STREP A DNA AMP PROBE: CPT | Mod: QW,S$GLB,, | Performed by: PHYSICIAN ASSISTANT

## 2021-10-13 PROCEDURE — U0002: ICD-10-PCS | Mod: QW,S$GLB,, | Performed by: PHYSICIAN ASSISTANT

## 2021-10-13 PROCEDURE — 99203 PR OFFICE/OUTPT VISIT, NEW, LEVL III, 30-44 MIN: ICD-10-PCS | Mod: S$GLB,,, | Performed by: PHYSICIAN ASSISTANT

## 2021-10-13 PROCEDURE — 87651 POCT STREP A MOLECULAR: ICD-10-PCS | Mod: QW,S$GLB,, | Performed by: PHYSICIAN ASSISTANT

## 2021-10-13 RX ORDER — ACETAMINOPHEN 500 MG
1000 TABLET ORAL
Status: COMPLETED | OUTPATIENT
Start: 2021-10-13 | End: 2021-10-13

## 2021-10-13 RX ADMIN — Medication 1000 MG: at 04:10

## 2021-10-14 ENCOUNTER — TELEPHONE (OUTPATIENT)
Dept: SPORTS MEDICINE | Facility: CLINIC | Age: 21
End: 2021-10-14

## 2021-10-14 DIAGNOSIS — R07.9 CHEST PAIN, UNSPECIFIED TYPE: ICD-10-CM

## 2021-10-14 DIAGNOSIS — Z86.79 HISTORY OF PERICARDITIS: Primary | ICD-10-CM

## 2021-10-15 ENCOUNTER — HOSPITAL ENCOUNTER (EMERGENCY)
Facility: HOSPITAL | Age: 21
Discharge: HOME | End: 2021-10-15
Attending: EMERGENCY MEDICINE

## 2021-10-15 ENCOUNTER — ATHLETIC TRAINING SESSION (OUTPATIENT)
Dept: SPORTS MEDICINE | Facility: CLINIC | Age: 21
End: 2021-10-15

## 2021-10-15 VITALS
BODY MASS INDEX: 29.44 KG/M2 | OXYGEN SATURATION: 97 % | HEIGHT: 62 IN | WEIGHT: 160 LBS | RESPIRATION RATE: 18 BRPM | HEART RATE: 111 BPM | TEMPERATURE: 102 F | DIASTOLIC BLOOD PRESSURE: 55 MMHG | SYSTOLIC BLOOD PRESSURE: 120 MMHG

## 2021-10-15 DIAGNOSIS — I30.1 ACUTE VIRAL PERICARDITIS: Primary | ICD-10-CM

## 2021-10-15 DIAGNOSIS — R00.0 TACHYCARDIA: ICD-10-CM

## 2021-10-15 DIAGNOSIS — R50.9 FEVER: ICD-10-CM

## 2021-10-15 DIAGNOSIS — I31.9 PERICARDITIS: ICD-10-CM

## 2021-10-15 PROBLEM — I30.0 RECURRENT IDIOPATHIC PERICARDITIS: Status: ACTIVE | Noted: 2021-10-15

## 2021-10-15 LAB
ALBUMIN SERPL BCP-MCNC: 3.9 G/DL (ref 3.5–5.2)
ALP SERPL-CCNC: 59 U/L (ref 55–135)
ALT SERPL W/O P-5'-P-CCNC: 22 U/L (ref 10–44)
ANION GAP SERPL CALC-SCNC: 12 MMOL/L (ref 8–16)
ASCENDING AORTA: 2.51 CM
AST SERPL-CCNC: 20 U/L (ref 10–40)
AV INDEX (PROSTH): 0.8
AV MEAN GRADIENT: 7 MMHG
AV PEAK GRADIENT: 12 MMHG
AV VALVE AREA: 3.21 CM2
AV VELOCITY RATIO: 0.84
BASOPHILS # BLD AUTO: 0.03 K/UL (ref 0–0.2)
BASOPHILS NFR BLD: 0.2 % (ref 0–1.9)
BILIRUB SERPL-MCNC: 1 MG/DL (ref 0.1–1)
BILIRUB UR QL STRIP: NEGATIVE
BNP SERPL-MCNC: 37 PG/ML (ref 0–99)
BSA FOR ECHO PROCEDURE: 1.78 M2
BUN SERPL-MCNC: 14 MG/DL (ref 6–20)
CALCIUM SERPL-MCNC: 9.9 MG/DL (ref 8.7–10.5)
CARBOXYHEMOGLOBIN: 0.5 % (ref 1.5–5)
CHLORIDE SERPL-SCNC: 105 MMOL/L (ref 95–110)
CK MB SERPL-MCNC: <1 NG/ML (ref 0.1–6.5)
CK MB SERPL-RTO: NORMAL % (ref 0–5)
CK SERPL-CCNC: 54 U/L (ref 20–200)
CLARITY UR REFRACT.AUTO: CLEAR
CO2 SERPL-SCNC: 22 MMOL/L (ref 23–29)
COLOR UR AUTO: ABNORMAL
CREAT SERPL-MCNC: 1 MG/DL (ref 0.5–1.4)
CRP SERPL-MCNC: 271.5 MG/L (ref 0–8.2)
CTP QC/QA: YES
CTP QC/QA: YES
CV ECHO LV RWT: 0.27 CM
DIFFERENTIAL METHOD: ABNORMAL
DOP CALC AO PEAK VEL: 1.74 M/S
DOP CALC AO VTI: 26.91 CM
DOP CALC LVOT AREA: 4 CM2
DOP CALC LVOT DIAMETER: 2.26 CM
DOP CALC LVOT PEAK VEL: 1.47 M/S
DOP CALC LVOT STROKE VOLUME: 86.28 CM3
DOP CALCLVOT PEAK VEL VTI: 21.52 CM
E WAVE DECELERATION TIME: 163.29 MSEC
E/A RATIO: 1.39
E/E' RATIO: 6.88 M/S
ECHO LV POSTERIOR WALL: 0.67 CM (ref 0.6–1.1)
EJECTION FRACTION: 55 %
EOSINOPHIL # BLD AUTO: 0 K/UL (ref 0–0.5)
EOSINOPHIL NFR BLD: 0.1 % (ref 0–8)
ERYTHROCYTE [DISTWIDTH] IN BLOOD BY AUTOMATED COUNT: 12.3 % (ref 11.5–14.5)
ERYTHROCYTE [SEDIMENTATION RATE] IN BLOOD BY WESTERGREN METHOD: 26 MM/HR (ref 0–23)
EST. GFR  (AFRICAN AMERICAN): >60 ML/MIN/1.73 M^2
EST. GFR  (NON AFRICAN AMERICAN): >60 ML/MIN/1.73 M^2
FRACTIONAL SHORTENING: 23 % (ref 28–44)
GLUCOSE SERPL-MCNC: 115 MG/DL (ref 70–110)
GLUCOSE UR QL STRIP: NEGATIVE
GROUP A STREP, MOLECULAR: NEGATIVE
HCT VFR BLD AUTO: 40.6 % (ref 40–54)
HETEROPH AB SERPL QL IA: NEGATIVE
HGB BLD-MCNC: 13.7 G/DL (ref 14–18)
HGB UR QL STRIP: NEGATIVE
IMM GRANULOCYTES # BLD AUTO: 0.07 K/UL (ref 0–0.04)
IMM GRANULOCYTES NFR BLD AUTO: 0.4 % (ref 0–0.5)
INTERVENTRICULAR SEPTUM: 0.82 CM (ref 0.6–1.1)
KETONES UR QL STRIP: ABNORMAL
LA MAJOR: 5.2 CM
LA MINOR: 5.16 CM
LA WIDTH: 3.78 CM
LEFT ATRIUM SIZE: 3.54 CM
LEFT ATRIUM VOLUME INDEX MOD: 30.2 ML/M2
LEFT ATRIUM VOLUME INDEX: 33.9 ML/M2
LEFT ATRIUM VOLUME MOD: 52.59 CM3
LEFT ATRIUM VOLUME: 58.92 CM3
LEFT INTERNAL DIMENSION IN SYSTOLE: 3.8 CM (ref 2.1–4)
LEFT VENTRICLE DIASTOLIC VOLUME INDEX: 65.06 ML/M2
LEFT VENTRICLE DIASTOLIC VOLUME: 113.2 ML
LEFT VENTRICLE MASS INDEX: 69 G/M2
LEFT VENTRICLE SYSTOLIC VOLUME INDEX: 35.5 ML/M2
LEFT VENTRICLE SYSTOLIC VOLUME: 61.76 ML
LEFT VENTRICULAR INTERNAL DIMENSION IN DIASTOLE: 4.91 CM (ref 3.5–6)
LEFT VENTRICULAR MASS: 120.22 G
LEUKOCYTE ESTERASE UR QL STRIP: NEGATIVE
LV LATERAL E/E' RATIO: 6.11 M/S
LV SEPTAL E/E' RATIO: 7.86 M/S
LYMPHOCYTES # BLD AUTO: 0.6 K/UL (ref 1–4.8)
LYMPHOCYTES NFR BLD: 3.3 % (ref 18–48)
MCH RBC QN AUTO: 29.9 PG (ref 27–31)
MCHC RBC AUTO-ENTMCNC: 33.7 G/DL (ref 32–36)
MCV RBC AUTO: 89 FL (ref 82–98)
MONOCYTES # BLD AUTO: 1.2 K/UL (ref 0.3–1)
MONOCYTES NFR BLD: 6.8 % (ref 4–15)
MV PEAK A VEL: 0.79 M/S
MV PEAK E VEL: 1.1 M/S
MV STENOSIS PRESSURE HALF TIME: 47.35 MS
MV VALVE AREA P 1/2 METHOD: 4.65 CM2
NEUTROPHILS # BLD AUTO: 15.3 K/UL (ref 1.8–7.7)
NEUTROPHILS NFR BLD: 89.2 % (ref 38–73)
NITRITE UR QL STRIP: NEGATIVE
NRBC BLD-RTO: 0 /100 WBC
PH UR STRIP: 7 [PH] (ref 5–8)
PISA TR MAX VEL: 2.37 M/S
PLATELET # BLD AUTO: 202 K/UL (ref 150–450)
PMV BLD AUTO: 11.2 FL (ref 9.2–12.9)
POC MOLECULAR INFLUENZA A AGN: NEGATIVE
POC MOLECULAR INFLUENZA B AGN: NEGATIVE
POTASSIUM SERPL-SCNC: 4.2 MMOL/L (ref 3.5–5.1)
PROT SERPL-MCNC: 7.1 G/DL (ref 6–8.4)
PROT UR QL STRIP: NEGATIVE
PULM VEIN S/D RATIO: 1.22
PV PEAK D VEL: 0.63 M/S
PV PEAK S VEL: 0.77 M/S
RA MAJOR: 4.48 CM
RA PRESSURE: 8 MMHG
RA WIDTH: 4.6 CM
RBC # BLD AUTO: 4.58 M/UL (ref 4.6–6.2)
RIGHT VENTRICULAR END-DIASTOLIC DIMENSION: 3.94 CM
RV TISSUE DOPPLER FREE WALL SYSTOLIC VELOCITY 1 (APICAL 4 CHAMBER VIEW): 16.4 CM/S
SARS-COV-2 RDRP RESP QL NAA+PROBE: NEGATIVE
SINUS: 2.59 CM
SODIUM SERPL-SCNC: 139 MMOL/L (ref 136–145)
SP GR UR STRIP: 1.01 (ref 1–1.03)
STJ: 1.79 CM
TDI LATERAL: 0.18 M/S
TDI SEPTAL: 0.14 M/S
TDI: 0.16 M/S
TR MAX PG: 22 MMHG
TRICUSPID ANNULAR PLANE SYSTOLIC EXCURSION: 2.27 CM
TROPONIN I SERPL DL<=0.01 NG/ML-MCNC: 0.01 NG/ML (ref 0–0.03)
TV REST PULMONARY ARTERY PRESSURE: 30 MMHG
URN SPEC COLLECT METH UR: ABNORMAL
WBC # BLD AUTO: 17.15 K/UL (ref 3.9–12.7)

## 2021-10-15 PROCEDURE — 93005 ELECTROCARDIOGRAM TRACING: CPT

## 2021-10-15 PROCEDURE — 85652 RBC SED RATE AUTOMATED: CPT | Performed by: EMERGENCY MEDICINE

## 2021-10-15 PROCEDURE — 80053 COMPREHEN METABOLIC PANEL: CPT | Performed by: EMERGENCY MEDICINE

## 2021-10-15 PROCEDURE — 83880 ASSAY OF NATRIURETIC PEPTIDE: CPT | Performed by: INTERNAL MEDICINE

## 2021-10-15 PROCEDURE — 87502 INFLUENZA DNA AMP PROBE: CPT

## 2021-10-15 PROCEDURE — 87651 STREP A DNA AMP PROBE: CPT | Performed by: EMERGENCY MEDICINE

## 2021-10-15 PROCEDURE — 84484 ASSAY OF TROPONIN QUANT: CPT | Performed by: INTERNAL MEDICINE

## 2021-10-15 PROCEDURE — 96360 HYDRATION IV INFUSION INIT: CPT

## 2021-10-15 PROCEDURE — 82375 ASSAY CARBOXYHB QUANT: CPT

## 2021-10-15 PROCEDURE — 85025 COMPLETE CBC W/AUTO DIFF WBC: CPT | Performed by: EMERGENCY MEDICINE

## 2021-10-15 PROCEDURE — 86140 C-REACTIVE PROTEIN: CPT | Performed by: EMERGENCY MEDICINE

## 2021-10-15 PROCEDURE — 82553 CREATINE MB FRACTION: CPT | Performed by: INTERNAL MEDICINE

## 2021-10-15 PROCEDURE — U0002 COVID-19 LAB TEST NON-CDC: HCPCS | Performed by: EMERGENCY MEDICINE

## 2021-10-15 PROCEDURE — 25000003 PHARM REV CODE 250: Performed by: EMERGENCY MEDICINE

## 2021-10-15 PROCEDURE — 93010 ELECTROCARDIOGRAM REPORT: CPT | Mod: ,,, | Performed by: INTERNAL MEDICINE

## 2021-10-15 PROCEDURE — 93010 EKG 12-LEAD: ICD-10-PCS | Mod: ,,, | Performed by: INTERNAL MEDICINE

## 2021-10-15 PROCEDURE — 81003 URINALYSIS AUTO W/O SCOPE: CPT | Performed by: EMERGENCY MEDICINE

## 2021-10-15 PROCEDURE — 63600175 PHARM REV CODE 636 W HCPCS: Performed by: EMERGENCY MEDICINE

## 2021-10-15 PROCEDURE — 99285 EMERGENCY DEPT VISIT HI MDM: CPT | Mod: 25

## 2021-10-15 PROCEDURE — 86308 HETEROPHILE ANTIBODY SCREEN: CPT | Performed by: EMERGENCY MEDICINE

## 2021-10-15 PROCEDURE — 99900035 HC TECH TIME PER 15 MIN (STAT)

## 2021-10-15 PROCEDURE — 96361 HYDRATE IV INFUSION ADD-ON: CPT

## 2021-10-15 PROCEDURE — 99285 EMERGENCY DEPT VISIT HI MDM: CPT | Mod: CS,,, | Performed by: EMERGENCY MEDICINE

## 2021-10-15 PROCEDURE — 99285 PR EMERGENCY DEPT VISIT,LEVEL V: ICD-10-PCS | Mod: CS,,, | Performed by: EMERGENCY MEDICINE

## 2021-10-15 RX ORDER — PANTOPRAZOLE SODIUM 40 MG/1
40 TABLET, DELAYED RELEASE ORAL
Status: COMPLETED | OUTPATIENT
Start: 2021-10-15 | End: 2021-10-15

## 2021-10-15 RX ORDER — PANTOPRAZOLE SODIUM 20 MG/1
20 TABLET, DELAYED RELEASE ORAL DAILY
Qty: 30 TABLET | Refills: 0 | Status: SHIPPED | OUTPATIENT
Start: 2021-10-15 | End: 2022-10-15

## 2021-10-15 RX ORDER — COLCHICINE 0.6 MG/1
0.6 TABLET ORAL 2 TIMES DAILY
Qty: 60 TABLET | Refills: 5 | Status: SHIPPED | OUTPATIENT
Start: 2021-10-15 | End: 2021-10-26 | Stop reason: SDUPTHER

## 2021-10-15 RX ORDER — ACETAMINOPHEN 325 MG/1
325 TABLET ORAL
Status: COMPLETED | OUTPATIENT
Start: 2021-10-15 | End: 2021-10-15

## 2021-10-15 RX ORDER — IBUPROFEN 600 MG/1
600 TABLET ORAL
Status: COMPLETED | OUTPATIENT
Start: 2021-10-15 | End: 2021-10-15

## 2021-10-15 RX ORDER — ACETAMINOPHEN 325 MG/1
650 TABLET ORAL
Status: DISCONTINUED | OUTPATIENT
Start: 2021-10-15 | End: 2021-10-15 | Stop reason: HOSPADM

## 2021-10-15 RX ORDER — IBUPROFEN 600 MG/1
600 TABLET ORAL 3 TIMES DAILY
Qty: 90 TABLET | Refills: 0 | Status: SHIPPED | OUTPATIENT
Start: 2021-10-15 | End: 2021-11-14

## 2021-10-15 RX ORDER — COLCHICINE 0.6 MG/1
0.6 TABLET, FILM COATED ORAL
Status: COMPLETED | OUTPATIENT
Start: 2021-10-15 | End: 2021-10-15

## 2021-10-15 RX ORDER — ACETAMINOPHEN 500 MG
1000 TABLET ORAL
Status: COMPLETED | OUTPATIENT
Start: 2021-10-15 | End: 2021-10-15

## 2021-10-15 RX ADMIN — SODIUM CHLORIDE, SODIUM LACTATE, POTASSIUM CHLORIDE, AND CALCIUM CHLORIDE 1000 ML: .6; .31; .03; .02 INJECTION, SOLUTION INTRAVENOUS at 09:10

## 2021-10-15 RX ADMIN — ACETAMINOPHEN 325 MG: 325 TABLET ORAL at 02:10

## 2021-10-15 RX ADMIN — PANTOPRAZOLE SODIUM 40 MG: 40 TABLET, DELAYED RELEASE ORAL at 01:10

## 2021-10-15 RX ADMIN — SODIUM CHLORIDE, SODIUM LACTATE, POTASSIUM CHLORIDE, AND CALCIUM CHLORIDE 1000 ML: .6; .31; .03; .02 INJECTION, SOLUTION INTRAVENOUS at 08:10

## 2021-10-15 RX ADMIN — COLCHICINE 0.6 MG: 0.6 TABLET, FILM COATED ORAL at 12:10

## 2021-10-15 RX ADMIN — ACETAMINOPHEN 1000 MG: 500 TABLET ORAL at 08:10

## 2021-10-15 RX ADMIN — IBUPROFEN 600 MG: 600 TABLET, FILM COATED ORAL at 12:10

## 2021-10-20 ENCOUNTER — ATHLETIC TRAINING SESSION (OUTPATIENT)
Dept: SPORTS MEDICINE | Facility: CLINIC | Age: 21
End: 2021-10-20

## 2021-10-26 ENCOUNTER — HOSPITAL ENCOUNTER (OUTPATIENT)
Dept: CARDIOLOGY | Facility: CLINIC | Age: 21
Discharge: HOME | End: 2021-10-26
Payer: COMMERCIAL

## 2021-10-26 ENCOUNTER — OFFICE VISIT (OUTPATIENT)
Dept: CARDIOLOGY | Facility: CLINIC | Age: 21
End: 2021-10-26

## 2021-10-26 ENCOUNTER — HOSPITAL ENCOUNTER (OUTPATIENT)
Dept: CARDIOLOGY | Facility: HOSPITAL | Age: 21
Discharge: HOME | End: 2021-10-26
Attending: INTERNAL MEDICINE
Payer: COMMERCIAL

## 2021-10-26 VITALS
HEART RATE: 56 BPM | WEIGHT: 169.31 LBS | HEIGHT: 70 IN | OXYGEN SATURATION: 98 % | SYSTOLIC BLOOD PRESSURE: 120 MMHG | DIASTOLIC BLOOD PRESSURE: 74 MMHG | BODY MASS INDEX: 24.24 KG/M2

## 2021-10-26 VITALS
HEART RATE: 70 BPM | WEIGHT: 160 LBS | SYSTOLIC BLOOD PRESSURE: 120 MMHG | DIASTOLIC BLOOD PRESSURE: 70 MMHG | HEIGHT: 62 IN | BODY MASS INDEX: 29.44 KG/M2

## 2021-10-26 DIAGNOSIS — Z86.16 HISTORY OF COVID-19: ICD-10-CM

## 2021-10-26 DIAGNOSIS — Z86.79 HISTORY OF PERICARDITIS: ICD-10-CM

## 2021-10-26 DIAGNOSIS — R07.9 CHEST PAIN, UNSPECIFIED TYPE: ICD-10-CM

## 2021-10-26 DIAGNOSIS — I31.9 CHRONIC IDIOPATHIC PERICARDITIS, UNSPECIFIED COMPLICATION STATUS: Primary | ICD-10-CM

## 2021-10-26 LAB
ASCENDING AORTA: 2.65 CM
AV INDEX (PROSTH): 0.93
AV MEAN GRADIENT: 3 MMHG
AV PEAK GRADIENT: 5 MMHG
AV VALVE AREA: 3.94 CM2
AV VELOCITY RATIO: 0.87
BSA FOR ECHO PROCEDURE: 1.78 M2
CV ECHO LV RWT: 0.39 CM
DOP CALC AO PEAK VEL: 1.1 M/S
DOP CALC AO VTI: 20.35 CM
DOP CALC LVOT AREA: 4.2 CM2
DOP CALC LVOT DIAMETER: 2.32 CM
DOP CALC LVOT PEAK VEL: 0.96 M/S
DOP CALC LVOT STROKE VOLUME: 80.24 CM3
DOP CALCLVOT PEAK VEL VTI: 18.99 CM
E WAVE DECELERATION TIME: 116.42 MSEC
E/A RATIO: 1.52
E/E' RATIO: 5.1 M/S
ECHO LV POSTERIOR WALL: 0.99 CM (ref 0.6–1.1)
EJECTION FRACTION: 60 %
FRACTIONAL SHORTENING: 25 % (ref 28–44)
INTERVENTRICULAR SEPTUM: 0.68 CM (ref 0.6–1.1)
IVRT: 79.92 MSEC
LA MAJOR: 4.56 CM
LA MINOR: 4.62 CM
LA WIDTH: 3.78 CM
LEFT ATRIUM SIZE: 3.21 CM
LEFT ATRIUM VOLUME INDEX MOD: 26.5 ML/M2
LEFT ATRIUM VOLUME INDEX: 27.2 ML/M2
LEFT ATRIUM VOLUME MOD: 46.06 CM3
LEFT ATRIUM VOLUME: 47.34 CM3
LEFT INTERNAL DIMENSION IN SYSTOLE: 3.81 CM (ref 2.1–4)
LEFT VENTRICLE DIASTOLIC VOLUME INDEX: 71.46 ML/M2
LEFT VENTRICLE DIASTOLIC VOLUME: 124.34 ML
LEFT VENTRICLE MASS INDEX: 86 G/M2
LEFT VENTRICLE SYSTOLIC VOLUME INDEX: 35.9 ML/M2
LEFT VENTRICLE SYSTOLIC VOLUME: 62.41 ML
LEFT VENTRICULAR INTERNAL DIMENSION IN DIASTOLE: 5.11 CM (ref 3.5–6)
LEFT VENTRICULAR MASS: 148.89 G
LV LATERAL E/E' RATIO: 4.39 M/S
LV SEPTAL E/E' RATIO: 6.08 M/S
MV A" WAVE DURATION": 15.41 MSEC
MV PEAK A VEL: 0.52 M/S
MV PEAK E VEL: 0.79 M/S
MV STENOSIS PRESSURE HALF TIME: 33.76 MS
MV VALVE AREA P 1/2 METHOD: 6.52 CM2
PISA TR MAX VEL: 2.29 M/S
PULM VEIN S/D RATIO: 0.79
PV PEAK D VEL: 0.62 M/S
PV PEAK S VEL: 0.49 M/S
RA MAJOR: 4.44 CM
RA PRESSURE: 3 MMHG
RA WIDTH: 4.53 CM
RIGHT VENTRICULAR END-DIASTOLIC DIMENSION: 3.62 CM
RV TISSUE DOPPLER FREE WALL SYSTOLIC VELOCITY 1 (APICAL 4 CHAMBER VIEW): 14.87 CM/S
SINUS: 3.03 CM
STJ: 2.53 CM
TDI LATERAL: 0.18 M/S
TDI SEPTAL: 0.13 M/S
TDI: 0.16 M/S
TR MAX PG: 21 MMHG
TRICUSPID ANNULAR PLANE SYSTOLIC EXCURSION: 1.96 CM
TV REST PULMONARY ARTERY PRESSURE: 24 MMHG

## 2021-10-26 PROCEDURE — 99999 PR PBB SHADOW E&M-EST. PATIENT-LVL V: CPT | Mod: PBBFAC,,, | Performed by: PHYSICIAN ASSISTANT

## 2021-10-26 PROCEDURE — 93306 TTE W/DOPPLER COMPLETE: CPT

## 2021-10-26 PROCEDURE — 93005 EKG 12-LEAD: ICD-10-PCS | Mod: S$GLB,,, | Performed by: ORTHOPAEDIC SURGERY

## 2021-10-26 PROCEDURE — 99215 OFFICE O/P EST HI 40 MIN: CPT | Mod: PBBFAC,25 | Performed by: PHYSICIAN ASSISTANT

## 2021-10-26 PROCEDURE — 99214 PR OFFICE/OUTPT VISIT, EST, LEVL IV, 30-39 MIN: ICD-10-PCS | Mod: S$PBB,25,, | Performed by: PHYSICIAN ASSISTANT

## 2021-10-26 PROCEDURE — 93010 ELECTROCARDIOGRAM REPORT: CPT | Mod: S$GLB,,, | Performed by: INTERNAL MEDICINE

## 2021-10-26 PROCEDURE — 99999 PR PBB SHADOW E&M-EST. PATIENT-LVL V: ICD-10-PCS | Mod: PBBFAC,,, | Performed by: PHYSICIAN ASSISTANT

## 2021-10-26 PROCEDURE — 93010 EKG 12-LEAD: ICD-10-PCS | Mod: S$GLB,,, | Performed by: INTERNAL MEDICINE

## 2021-10-26 PROCEDURE — 93306 ECHO (CUPID ONLY): ICD-10-PCS | Mod: 26,,, | Performed by: INTERNAL MEDICINE

## 2021-10-26 PROCEDURE — 93306 TTE W/DOPPLER COMPLETE: CPT | Mod: 26,,, | Performed by: INTERNAL MEDICINE

## 2021-10-26 PROCEDURE — 99214 OFFICE O/P EST MOD 30 MIN: CPT | Mod: S$PBB,25,, | Performed by: PHYSICIAN ASSISTANT

## 2021-10-26 PROCEDURE — 93005 ELECTROCARDIOGRAM TRACING: CPT | Mod: S$GLB,,, | Performed by: ORTHOPAEDIC SURGERY

## 2021-10-26 RX ORDER — COLCHICINE 0.6 MG/1
0.6 TABLET ORAL 2 TIMES DAILY
Qty: 60 TABLET | Refills: 5 | Status: SHIPPED | OUTPATIENT
Start: 2021-10-26 | End: 2022-04-24